# Patient Record
Sex: FEMALE | HISPANIC OR LATINO | Employment: FULL TIME | URBAN - METROPOLITAN AREA
[De-identification: names, ages, dates, MRNs, and addresses within clinical notes are randomized per-mention and may not be internally consistent; named-entity substitution may affect disease eponyms.]

---

## 2017-01-18 ENCOUNTER — HOSPITAL ENCOUNTER (OUTPATIENT)
Dept: LAB | Facility: MEDICAL CENTER | Age: 47
End: 2017-01-18
Attending: OBSTETRICS & GYNECOLOGY
Payer: COMMERCIAL

## 2017-01-18 PROCEDURE — 88175 CYTOPATH C/V AUTO FLUID REDO: CPT

## 2017-01-18 PROCEDURE — 87491 CHLMYD TRACH DNA AMP PROBE: CPT

## 2017-01-18 PROCEDURE — 87624 HPV HI-RISK TYP POOLED RSLT: CPT

## 2017-01-18 PROCEDURE — 87591 N.GONORRHOEAE DNA AMP PROB: CPT

## 2017-01-18 PROCEDURE — 87070 CULTURE OTHR SPECIMN AEROBIC: CPT

## 2017-01-19 LAB
AMBIGUOUS DTTM AMBI4: NORMAL
C TRACH DNA GENITAL QL NAA+PROBE: NEGATIVE
CYTOLOGY REG CYTOL: NORMAL
HPV HR 12 DNA CVX QL NAA+PROBE: NEGATIVE
HPV16 DNA SPEC QL NAA+PROBE: NEGATIVE
HPV18 DNA SPEC QL NAA+PROBE: NEGATIVE
N GONORRHOEA DNA GENITAL QL NAA+PROBE: NEGATIVE
SIGNIFICANT IND 70042: NORMAL
SITE SITE: NORMAL
SOURCE SOURCE: NORMAL
SPECIMEN SOURCE: NORMAL
SPECIMEN SOURCE: NORMAL

## 2017-01-21 LAB
BACTERIA GENITAL AEROBE CULT: NORMAL
SIGNIFICANT IND 70042: NORMAL
SITE SITE: NORMAL
SOURCE SOURCE: NORMAL

## 2017-01-27 ENCOUNTER — HOSPITAL ENCOUNTER (OUTPATIENT)
Dept: LAB | Facility: MEDICAL CENTER | Age: 47
End: 2017-01-27
Attending: OBSTETRICS & GYNECOLOGY
Payer: COMMERCIAL

## 2017-01-27 LAB
25(OH)D3 SERPL-MCNC: 19 NG/ML (ref 30–100)
ALBUMIN SERPL BCP-MCNC: 4.3 G/DL (ref 3.2–4.9)
ALBUMIN/GLOB SERPL: 1.4 G/DL
ALP SERPL-CCNC: 59 U/L (ref 30–99)
ALT SERPL-CCNC: 26 U/L (ref 2–50)
ANION GAP SERPL CALC-SCNC: 6 MMOL/L (ref 0–11.9)
APPEARANCE UR: CLEAR
AST SERPL-CCNC: 20 U/L (ref 12–45)
B-HCG SERPL-ACNC: <0.6 MIU/ML (ref 0–5)
BASOPHILS # BLD AUTO: 0.04 K/UL (ref 0–0.12)
BASOPHILS NFR BLD AUTO: 0.6 % (ref 0–1.8)
BILIRUB SERPL-MCNC: 0.5 MG/DL (ref 0.1–1.5)
BILIRUB UR QL STRIP.AUTO: NEGATIVE
BUN SERPL-MCNC: 12 MG/DL (ref 8–22)
CALCIUM SERPL-MCNC: 9.3 MG/DL (ref 8.5–10.5)
CHLORIDE SERPL-SCNC: 105 MMOL/L (ref 96–112)
CHOLEST SERPL-MCNC: 165 MG/DL (ref 100–199)
CO2 SERPL-SCNC: 26 MMOL/L (ref 20–33)
COLOR UR AUTO: NORMAL
CREAT SERPL-MCNC: 0.67 MG/DL (ref 0.5–1.4)
EOSINOPHIL # BLD: 0.24 K/UL (ref 0–0.51)
EOSINOPHIL NFR BLD AUTO: 3.5 % (ref 0–6.9)
ERYTHROCYTE [DISTWIDTH] IN BLOOD BY AUTOMATED COUNT: 39.2 FL (ref 35.9–50)
EST. AVERAGE GLUCOSE BLD GHB EST-MCNC: 103 MG/DL
ESTRADIOL SERPL-MCNC: 432 PG/ML
FSH SERPL-ACNC: 5.9 MIU/ML
GLOBULIN SER CALC-MCNC: 3 G/DL (ref 1.9–3.5)
GLUCOSE SERPL-MCNC: 99 MG/DL (ref 65–99)
GLUCOSE UR STRIP.AUTO-MCNC: NEGATIVE MG/DL
HBA1C MFR BLD: 5.2 % (ref 0–5.6)
HCT VFR BLD AUTO: 42.2 % (ref 37–47)
HDLC SERPL-MCNC: 46 MG/DL
HGB BLD-MCNC: 14.1 G/DL (ref 12–16)
IMM GRANULOCYTES # BLD AUTO: 0.06 K/UL (ref 0–0.11)
IMM GRANULOCYTES NFR BLD AUTO: 0.9 % (ref 0–0.9)
KETONES UR STRIP.AUTO-MCNC: NEGATIVE MG/DL
LDLC SERPL CALC-MCNC: 84 MG/DL
LEUKOCYTE ESTERASE UR QL STRIP.AUTO: NEGATIVE
LYMPHOCYTES # BLD: 1.44 K/UL (ref 1–4.8)
LYMPHOCYTES NFR BLD AUTO: 21.1 % (ref 22–41)
MCH RBC QN AUTO: 29.6 PG (ref 27–33)
MCHC RBC AUTO-ENTMCNC: 33.4 G/DL (ref 33.6–35)
MCV RBC AUTO: 88.7 FL (ref 81.4–97.8)
MICRO URNS: NORMAL
MONOCYTES # BLD: 0.47 K/UL (ref 0–0.85)
MONOCYTES NFR BLD AUTO: 6.9 % (ref 0–13.4)
NEUTROPHILS # BLD: 4.58 K/UL (ref 2–7.15)
NEUTROPHILS NFR BLD AUTO: 67 % (ref 44–72)
NITRITE UR QL STRIP.AUTO: NEGATIVE
NRBC # BLD AUTO: 0 K/UL
NRBC BLD-RTO: 0 /100 WBC
PH UR: 6 [PH]
PLATELET # BLD AUTO: 240 K/UL (ref 164–446)
PMV BLD AUTO: 12.5 FL (ref 9–12.9)
POTASSIUM SERPL-SCNC: 4.2 MMOL/L (ref 3.6–5.5)
PROLACTIN SERPL-MCNC: 6.04 NG/ML (ref 2.8–26)
PROT SERPL-MCNC: 7.3 G/DL (ref 6–8.2)
PROT UR QL STRIP: NEGATIVE MG/DL
RBC # BLD AUTO: 4.76 M/UL (ref 4.2–5.4)
RBC UR QL AUTO: NEGATIVE
SODIUM SERPL-SCNC: 137 MMOL/L (ref 135–145)
SP GR UR STRIP.AUTO: 1.01
TRIGL SERPL-MCNC: 173 MG/DL (ref 0–149)
TSH SERPL DL<=0.005 MIU/L-ACNC: 1 UIU/ML (ref 0.3–3.7)
WBC # BLD AUTO: 6.8 K/UL (ref 4.8–10.8)

## 2017-01-27 PROCEDURE — 84443 ASSAY THYROID STIM HORMONE: CPT

## 2017-01-27 PROCEDURE — 36415 COLL VENOUS BLD VENIPUNCTURE: CPT

## 2017-01-27 PROCEDURE — 83036 HEMOGLOBIN GLYCOSYLATED A1C: CPT

## 2017-01-27 PROCEDURE — 84146 ASSAY OF PROLACTIN: CPT

## 2017-01-27 PROCEDURE — 82670 ASSAY OF TOTAL ESTRADIOL: CPT

## 2017-01-27 PROCEDURE — 81003 URINALYSIS AUTO W/O SCOPE: CPT

## 2017-01-27 PROCEDURE — 80061 LIPID PANEL: CPT

## 2017-01-27 PROCEDURE — 84702 CHORIONIC GONADOTROPIN TEST: CPT

## 2017-01-27 PROCEDURE — 80053 COMPREHEN METABOLIC PANEL: CPT

## 2017-01-27 PROCEDURE — 83001 ASSAY OF GONADOTROPIN (FSH): CPT

## 2017-01-27 PROCEDURE — 82306 VITAMIN D 25 HYDROXY: CPT

## 2017-01-27 PROCEDURE — 85025 COMPLETE CBC W/AUTO DIFF WBC: CPT

## 2017-03-28 ENCOUNTER — HOSPITAL ENCOUNTER (OUTPATIENT)
Dept: RADIOLOGY | Facility: MEDICAL CENTER | Age: 47
End: 2017-03-28
Attending: OBSTETRICS & GYNECOLOGY
Payer: COMMERCIAL

## 2017-03-28 DIAGNOSIS — Z12.31 SCREENING MAMMOGRAM, ENCOUNTER FOR: ICD-10-CM

## 2017-03-28 PROCEDURE — 77063 BREAST TOMOSYNTHESIS BI: CPT

## 2018-04-11 ENCOUNTER — HOSPITAL ENCOUNTER (OUTPATIENT)
Dept: RADIOLOGY | Facility: MEDICAL CENTER | Age: 48
End: 2018-04-11
Attending: OBSTETRICS & GYNECOLOGY
Payer: COMMERCIAL

## 2018-04-11 DIAGNOSIS — Z12.31 VISIT FOR SCREENING MAMMOGRAM: ICD-10-CM

## 2018-04-11 PROCEDURE — 77063 BREAST TOMOSYNTHESIS BI: CPT

## 2018-05-10 ENCOUNTER — HOSPITAL ENCOUNTER (OUTPATIENT)
Facility: MEDICAL CENTER | Age: 48
End: 2018-05-10
Attending: PHYSICIAN ASSISTANT
Payer: COMMERCIAL

## 2018-05-10 ENCOUNTER — OFFICE VISIT (OUTPATIENT)
Dept: URGENT CARE | Facility: CLINIC | Age: 48
End: 2018-05-10
Payer: COMMERCIAL

## 2018-05-10 VITALS
WEIGHT: 130 LBS | HEART RATE: 94 BPM | TEMPERATURE: 102.9 F | OXYGEN SATURATION: 98 % | RESPIRATION RATE: 16 BRPM | HEIGHT: 60 IN | SYSTOLIC BLOOD PRESSURE: 112 MMHG | DIASTOLIC BLOOD PRESSURE: 70 MMHG | BODY MASS INDEX: 25.52 KG/M2

## 2018-05-10 DIAGNOSIS — N12 PYELONEPHRITIS: Primary | ICD-10-CM

## 2018-05-10 DIAGNOSIS — N12 PYELONEPHRITIS: ICD-10-CM

## 2018-05-10 LAB
APPEARANCE UR: NORMAL
BILIRUB UR STRIP-MCNC: NORMAL MG/DL
COLOR UR AUTO: YELLOW
GLUCOSE UR STRIP.AUTO-MCNC: NORMAL MG/DL
KETONES UR STRIP.AUTO-MCNC: NORMAL MG/DL
LEUKOCYTE ESTERASE UR QL STRIP.AUTO: NORMAL
NITRITE UR QL STRIP.AUTO: NORMAL
PH UR STRIP.AUTO: 6 [PH] (ref 5–8)
PROT UR QL STRIP: NORMAL MG/DL
RBC UR QL AUTO: NORMAL
SP GR UR STRIP.AUTO: 1.01
UROBILINOGEN UR STRIP-MCNC: NORMAL MG/DL

## 2018-05-10 PROCEDURE — 87077 CULTURE AEROBIC IDENTIFY: CPT

## 2018-05-10 PROCEDURE — 87186 SC STD MICRODIL/AGAR DIL: CPT

## 2018-05-10 PROCEDURE — 81002 URINALYSIS NONAUTO W/O SCOPE: CPT | Performed by: PHYSICIAN ASSISTANT

## 2018-05-10 PROCEDURE — 99204 OFFICE O/P NEW MOD 45 MIN: CPT | Performed by: PHYSICIAN ASSISTANT

## 2018-05-10 PROCEDURE — 87086 URINE CULTURE/COLONY COUNT: CPT

## 2018-05-10 RX ORDER — CIPROFLOXACIN 500 MG/1
500 TABLET, FILM COATED ORAL EVERY 12 HOURS
Qty: 14 TAB | Refills: 0 | Status: SHIPPED | OUTPATIENT
Start: 2018-05-10 | End: 2018-05-17

## 2018-05-10 RX ORDER — PHENAZOPYRIDINE HYDROCHLORIDE 200 MG/1
200 TABLET, FILM COATED ORAL 3 TIMES DAILY
Qty: 6 TAB | Refills: 0 | Status: SHIPPED | OUTPATIENT
Start: 2018-05-10 | End: 2018-05-12

## 2018-05-13 LAB
BACTERIA UR CULT: ABNORMAL
BACTERIA UR CULT: ABNORMAL
SIGNIFICANT IND 70042: ABNORMAL
SITE SITE: ABNORMAL
SOURCE SOURCE: ABNORMAL

## 2018-05-13 NOTE — PROGRESS NOTES
Subjective:      Pt is a 47 y.o. female who presents with Back Pain ((R) side x 5 days and radiating to her left and up to her shoulder)            HPI  PT comes into the UC with a chief complaint of dysuria, burning on urination, urgency, frequency, and bladder pressure and has noticed blood in her urine x 5 days. PT denies fevers or chills, CP, SOB, NVD, paresthesias, headaches, dizziness, change in vision, hives, or joint pain. PT states the pain is a 6/10 with burning upon urination, aching in nature and worse at night. She states she has not taken any RX meds for this issue. She admits to B/L flank or back pain as well. The pt's medication list, problem list, and allergies have been evaluated and reviewed during today's visit.      PMH:  Negative per pt.      PSH:  Negative per pt.      Fam Hx:  the patient's family history is not pertinent to their current complaint      Soc HX:  Social History     Social History   • Marital status:      Spouse name: N/A   • Number of children: N/A   • Years of education: N/A     Occupational History   • Not on file.     Social History Main Topics   • Smoking status: Never Smoker   • Smokeless tobacco: Never Used   • Alcohol use No   • Drug use: No   • Sexual activity: Not on file     Other Topics Concern   • Not on file     Social History Narrative    ** Merged History Encounter **              Medications:    Current Outpatient Prescriptions:   •  ciprofloxacin (CIPRO) 500 MG Tab, Take 1 Tab by mouth every 12 hours for 7 days., Disp: 14 Tab, Rfl: 0  •  phenazopyridine (PYRIDIUM) 200 MG Tab, Take 1 Tab by mouth 3 times a day for 2 days., Disp: 6 Tab, Rfl: 0  •  acetaminophen (TYLENOL) 500 MG TABS, Given in UC, Disp: 2 Tab, Rfl: 0  •  ondansetron (ZOFRAN) 4 MG TABS, Take 1 Tab by mouth 4 times a day as needed for Nausea/Vomiting., Disp: 8 Tab, Rfl: 0      Allergies:  Seasonal    ROS  Review of Systems   Constitutional: Negative for fever, chills and malaise/fatigue.    HENT: Negative for congestion and sore throat.    Eyes: Negative for blurred vision, double vision and photophobia.   Respiratory: Negative for cough and shortness of breath.    Cardiovascular: Negative for chest pain and palpitations.   Gastrointestinal: Negative for nausea, vomiting, abdominal pain, diarrhea and constipation.   Genitourinary: Positive for dysuria, urgency and frequency. POS for hematuria and flank pain.   Musculoskeletal: Negative for joint pain and myalgias.   Skin: Negative for rash.   Neurological: Negative for dizziness, tingling and headaches.   Endo/Heme/Allergies: Does not bruise/bleed easily.   Psychiatric/Behavioral: Negative for depression. The patient is not nervous/anxious.    All other systems reviewed and are negative.       Objective:     /70   Pulse 94   Temp (!) 39.4 °C (102.9 °F)   Resp 16   Ht 1.524 m (5')   Wt 59 kg (130 lb)   SpO2 98%   BMI 25.39 kg/m²      Physical Exam  Physical Exam   Constitutional: She is oriented to person, place, and time. She appears well-developed and well-nourished. No distress.   HENT:   Head: Normocephalic and atraumatic.   Right Ear: External ear normal.   Left Ear: External ear normal.   Nose: Nose normal.   Mouth/Throat: Oropharynx is clear and moist. No oropharyngeal exudate.   Eyes: Conjunctivae normal and EOM are normal. Pupils are equal, round, and reactive to light.   Neck: Normal range of motion. Neck supple. No thyromegaly present.   Cardiovascular: Normal rate, regular rhythm, normal heart sounds and intact distal pulses.  Exam reveals no gallop and no friction rub.    No murmur heard.  Pulmonary/Chest: Effort normal and breath sounds normal. No respiratory distress. She has no wheezes. She has no rales. She exhibits no tenderness.   Abdominal: Soft. Bowel sounds are normal. She exhibits no distension and no mass. There is no tenderness. There is no rebound and no guarding.   Genitourinary:        Pt deferred +B/L flank  pain  Musculoskeletal: Normal range of motion. She exhibits no edema and no tenderness.   Lymphadenopathy:     She has no cervical adenopathy.   Neurological: She is alert and oriented to person, place, and time. She has normal reflexes. No cranial nerve deficit.   Skin: Skin is warm and dry. No rash noted. No erythema.   Psychiatric: She has a normal mood and affect. Her behavior is normal. Judgment and thought content normal.             Assessment/Plan:     1. Pyelonephritis    - POCT Urinalysis-->LEUKS AND BLOOD  - Urine Culture; Future  - ciprofloxacin (CIPRO) 500 MG Tab; Take 1 Tab by mouth every 12 hours for 7 days.  Dispense: 14 Tab; Refill: 0  - phenazopyridine (PYRIDIUM) 200 MG Tab; Take 1 Tab by mouth 3 times a day for 2 days.  Dispense: 6 Tab; Refill: 0    Rest, fluids encouraged.  AVS with medical info given.  Pt was in full understanding and agreement with the plan.  Follow-up as needed if symptoms worsen or fail to improve.

## 2018-05-14 ENCOUNTER — TELEPHONE (OUTPATIENT)
Dept: URGENT CARE | Facility: CLINIC | Age: 48
End: 2018-05-14

## 2018-05-14 DIAGNOSIS — N30.01 ACUTE CYSTITIS WITH HEMATURIA: Primary | ICD-10-CM

## 2018-05-14 RX ORDER — NITROFURANTOIN 25; 75 MG/1; MG/1
100 CAPSULE ORAL 2 TIMES DAILY
Qty: 14 CAP | Refills: 0 | Status: SHIPPED | OUTPATIENT
Start: 2018-05-14 | End: 2018-05-21

## 2018-05-14 NOTE — TELEPHONE ENCOUNTER
Called and left message with pt about urine culture results which came back positive for E.coli.   I told her she has to switch to another abx as the cipro was resistant. I called in Macrobid fr the pt.   Encouraged Pt to call back with questions.  Mitchell Shaikh PA-C

## 2018-07-25 ENCOUNTER — NON-PROVIDER VISIT (OUTPATIENT)
Dept: OCCUPATIONAL MEDICINE | Facility: CLINIC | Age: 48
End: 2018-07-25

## 2018-07-25 DIAGNOSIS — Z23 NEED FOR VACCINATION: ICD-10-CM

## 2018-07-25 PROCEDURE — 90746 HEPB VACCINE 3 DOSE ADULT IM: CPT | Performed by: PREVENTIVE MEDICINE

## 2018-08-27 ENCOUNTER — OFFICE VISIT (OUTPATIENT)
Dept: URGENT CARE | Facility: CLINIC | Age: 48
End: 2018-08-27
Payer: COMMERCIAL

## 2018-08-27 ENCOUNTER — HOSPITAL ENCOUNTER (OUTPATIENT)
Facility: MEDICAL CENTER | Age: 48
End: 2018-08-27
Attending: FAMILY MEDICINE
Payer: COMMERCIAL

## 2018-08-27 VITALS
OXYGEN SATURATION: 98 % | SYSTOLIC BLOOD PRESSURE: 116 MMHG | TEMPERATURE: 100.1 F | DIASTOLIC BLOOD PRESSURE: 70 MMHG | WEIGHT: 133 LBS | BODY MASS INDEX: 26.11 KG/M2 | HEART RATE: 98 BPM | RESPIRATION RATE: 16 BRPM | HEIGHT: 60 IN

## 2018-08-27 DIAGNOSIS — R30.0 DYSURIA: ICD-10-CM

## 2018-08-27 LAB
APPEARANCE UR: NORMAL
BILIRUB UR STRIP-MCNC: NORMAL MG/DL
COLOR UR AUTO: NORMAL
GLUCOSE UR STRIP.AUTO-MCNC: NORMAL MG/DL
KETONES UR STRIP.AUTO-MCNC: NORMAL MG/DL
LEUKOCYTE ESTERASE UR QL STRIP.AUTO: NORMAL
NITRITE UR QL STRIP.AUTO: POSITIVE
PH UR STRIP.AUTO: 7.5 [PH] (ref 5–8)
PROT UR QL STRIP: 100 MG/DL
RBC UR QL AUTO: NORMAL
SP GR UR STRIP.AUTO: 1.02
UROBILINOGEN UR STRIP-MCNC: NORMAL MG/DL

## 2018-08-27 PROCEDURE — 99214 OFFICE O/P EST MOD 30 MIN: CPT | Mod: 25 | Performed by: FAMILY MEDICINE

## 2018-08-27 PROCEDURE — 81002 URINALYSIS NONAUTO W/O SCOPE: CPT | Performed by: FAMILY MEDICINE

## 2018-08-27 RX ORDER — CIPROFLOXACIN 500 MG/1
500 TABLET, FILM COATED ORAL EVERY 12 HOURS
Qty: 14 TAB | Refills: 0 | Status: SHIPPED | OUTPATIENT
Start: 2018-08-27 | End: 2018-09-03

## 2018-08-27 RX ORDER — ONDANSETRON 4 MG/1
4 TABLET, ORALLY DISINTEGRATING ORAL EVERY 8 HOURS PRN
Qty: 12 TAB | Refills: 0 | Status: SHIPPED | OUTPATIENT
Start: 2018-08-27 | End: 2019-07-02

## 2018-08-27 ASSESSMENT — ENCOUNTER SYMPTOMS
DIZZINESS: 0
FEVER: 1
FOCAL WEAKNESS: 0
VOMITING: 0
NAUSEA: 1

## 2018-08-27 NOTE — PROGRESS NOTES
Subjective:      Yareli Chapman is a 48 y.o. female who presents with Fever (Rt side pain x yesturday )    Chief Complaint   Patient presents with   • Fever     Rt side pain x yesturday         - This is a very pleasant 48 y.o. female with complaints of Rt flank pain and fever w/ headaches x 1 day along w/ some nausea. Has some dysuria           ALLERGIES:  Seasonal     PMH:  History reviewed. No pertinent past medical history.     MEDS:    Current Outpatient Prescriptions:   •  ciprofloxacin (CIPRO) 500 MG Tab, Take 1 Tab by mouth every 12 hours for 7 days., Disp: 14 Tab, Rfl: 0  •  ondansetron (ZOFRAN ODT) 4 MG TABLET DISPERSIBLE, Take 1 Tab by mouth every 8 hours as needed., Disp: 12 Tab, Rfl: 0    Current Facility-Administered Medications:   •  cefTRIAXone (ROCEPHIN) 1 g, lidocaine (XYLOCAINE) 1 % 3.6 mL for IM use, 1 g, Intramuscular, Once, Huseyin Martin M.D.    ** I have documented what I find to be significant in regards to past medical, social, family and surgical history  in my HPI or under PMH/PSH/FH review section, otherwise it is contributory **           HPI    Review of Systems   Constitutional: Positive for fever.   Gastrointestinal: Positive for nausea. Negative for vomiting.   Genitourinary: Positive for dysuria and frequency.   Neurological: Negative for dizziness and focal weakness.   All other systems reviewed and are negative.         Objective:     /70   Pulse 98   Temp 37.8 °C (100.1 °F)   Resp 16   Ht 1.524 m (5')   Wt 60.3 kg (133 lb)   SpO2 98%   BMI 25.97 kg/m²      Physical Exam   Constitutional: She appears well-developed. No distress.   HENT:   Head: Normocephalic and atraumatic.   Cardiovascular: Regular rhythm.    No murmur heard.  Pulmonary/Chest: Effort normal. No respiratory distress.   Abdominal: Soft. Bowel sounds are normal. There is no tenderness. There is no rebound and no guarding.   Neurological: She is alert. She exhibits normal muscle tone.   Skin: Skin  is warm and dry.   Psychiatric: She has a normal mood and affect. Judgment normal.   Nursing note and vitals reviewed.              Assessment/Plan:         1. Dysuria  POCT Urinalysis    Urine Culture    ciprofloxacin (CIPRO) 500 MG Tab    cefTRIAXone (ROCEPHIN) 1 g, lidocaine (XYLOCAINE) 1 % 3.6 mL for IM use    ondansetron (ZOFRAN ODT) 4 MG TABLET DISPERSIBLE    CANCELED: POCT Pregnancy       - rest hydrate  - 24hr recheck       Dx & d/c instructions discussed w/ patient and/or family members. Follow up w/ Prvt Dr or here in 1 day,  ER if worse      Possible side effects (i.e. Rash, GI upset/constipation, sedation, elevation of BP or sugars) of any medications given discussed.

## 2018-08-28 DIAGNOSIS — R30.0 DYSURIA: ICD-10-CM

## 2018-11-28 ENCOUNTER — NON-PROVIDER VISIT (OUTPATIENT)
Dept: OCCUPATIONAL MEDICINE | Facility: CLINIC | Age: 48
End: 2018-11-28

## 2018-11-28 DIAGNOSIS — Z23 NEED FOR HEPATITIS VACCINATION: ICD-10-CM

## 2018-11-28 PROCEDURE — 90746 HEPB VACCINE 3 DOSE ADULT IM: CPT | Performed by: PREVENTIVE MEDICINE

## 2019-04-11 ENCOUNTER — NON-PROVIDER VISIT (OUTPATIENT)
Dept: OCCUPATIONAL MEDICINE | Facility: CLINIC | Age: 49
End: 2019-04-11

## 2019-04-11 DIAGNOSIS — Z23 NEED FOR HEPATITIS B VACCINATION: ICD-10-CM

## 2019-04-11 PROCEDURE — 90746 HEPB VACCINE 3 DOSE ADULT IM: CPT | Performed by: PREVENTIVE MEDICINE

## 2019-04-25 ENCOUNTER — HOSPITAL ENCOUNTER (OUTPATIENT)
Dept: LAB | Facility: MEDICAL CENTER | Age: 49
End: 2019-04-25
Attending: NURSE PRACTITIONER
Payer: COMMERCIAL

## 2019-04-25 LAB
ALBUMIN SERPL BCP-MCNC: 4.3 G/DL (ref 3.2–4.9)
ALBUMIN/GLOB SERPL: 1.4 G/DL
ALP SERPL-CCNC: 81 U/L (ref 30–99)
ALT SERPL-CCNC: 21 U/L (ref 2–50)
ANION GAP SERPL CALC-SCNC: 7 MMOL/L (ref 0–11.9)
AST SERPL-CCNC: 18 U/L (ref 12–45)
BILIRUB SERPL-MCNC: 0.6 MG/DL (ref 0.1–1.5)
BUN SERPL-MCNC: 12 MG/DL (ref 8–22)
CALCIUM SERPL-MCNC: 9.3 MG/DL (ref 8.5–10.5)
CHLORIDE SERPL-SCNC: 106 MMOL/L (ref 96–112)
CHOLEST SERPL-MCNC: 151 MG/DL (ref 100–199)
CO2 SERPL-SCNC: 26 MMOL/L (ref 20–33)
CREAT SERPL-MCNC: 0.61 MG/DL (ref 0.5–1.4)
ERYTHROCYTE [DISTWIDTH] IN BLOOD BY AUTOMATED COUNT: 45 FL (ref 35.9–50)
FASTING STATUS PATIENT QL REPORTED: NORMAL
GLOBULIN SER CALC-MCNC: 3 G/DL (ref 1.9–3.5)
GLUCOSE SERPL-MCNC: 93 MG/DL (ref 65–99)
HCT VFR BLD AUTO: 45.7 % (ref 37–47)
HDLC SERPL-MCNC: 49 MG/DL
HGB BLD-MCNC: 14.7 G/DL (ref 12–16)
LDLC SERPL CALC-MCNC: 69 MG/DL
MCH RBC QN AUTO: 30.1 PG (ref 27–33)
MCHC RBC AUTO-ENTMCNC: 32.2 G/DL (ref 33.6–35)
MCV RBC AUTO: 93.6 FL (ref 81.4–97.8)
PLATELET # BLD AUTO: 228 K/UL (ref 164–446)
PMV BLD AUTO: 12.2 FL (ref 9–12.9)
POTASSIUM SERPL-SCNC: 4.3 MMOL/L (ref 3.6–5.5)
PROT SERPL-MCNC: 7.3 G/DL (ref 6–8.2)
RBC # BLD AUTO: 4.88 M/UL (ref 4.2–5.4)
SODIUM SERPL-SCNC: 139 MMOL/L (ref 135–145)
TRIGL SERPL-MCNC: 163 MG/DL (ref 0–149)
TSH SERPL DL<=0.005 MIU/L-ACNC: 2.17 UIU/ML (ref 0.38–5.33)
WBC # BLD AUTO: 7.4 K/UL (ref 4.8–10.8)

## 2019-04-25 PROCEDURE — 85027 COMPLETE CBC AUTOMATED: CPT

## 2019-04-25 PROCEDURE — 36415 COLL VENOUS BLD VENIPUNCTURE: CPT

## 2019-04-25 PROCEDURE — 80061 LIPID PANEL: CPT

## 2019-04-25 PROCEDURE — 80053 COMPREHEN METABOLIC PANEL: CPT

## 2019-04-25 PROCEDURE — 84443 ASSAY THYROID STIM HORMONE: CPT

## 2019-06-28 ENCOUNTER — HOSPITAL ENCOUNTER (OUTPATIENT)
Dept: LAB | Facility: MEDICAL CENTER | Age: 49
End: 2019-06-28
Attending: OBSTETRICS & GYNECOLOGY
Payer: COMMERCIAL

## 2019-06-28 ENCOUNTER — OCCUPATIONAL MEDICINE (OUTPATIENT)
Dept: URGENT CARE | Facility: CLINIC | Age: 49
End: 2019-06-28
Payer: COMMERCIAL

## 2019-06-28 VITALS
HEIGHT: 60 IN | RESPIRATION RATE: 16 BRPM | HEART RATE: 68 BPM | OXYGEN SATURATION: 99 % | WEIGHT: 133 LBS | TEMPERATURE: 98 F | BODY MASS INDEX: 26.11 KG/M2 | DIASTOLIC BLOOD PRESSURE: 72 MMHG | SYSTOLIC BLOOD PRESSURE: 124 MMHG

## 2019-06-28 DIAGNOSIS — Z02.83 ENCOUNTER FOR DRUG SCREENING: ICD-10-CM

## 2019-06-28 DIAGNOSIS — S09.93XA FACIAL INJURY, INITIAL ENCOUNTER: ICD-10-CM

## 2019-06-28 DIAGNOSIS — Z02.1 PRE-EMPLOYMENT DRUG SCREENING: ICD-10-CM

## 2019-06-28 LAB
AMP AMPHETAMINE: NORMAL
BREATH ALCOHOL COMMENT: NORMAL
COC COCAINE: NORMAL
INT CON NEG: NORMAL
INT CON POS: NORMAL
MET METHAMPHETAMINES: NORMAL
OPI OPIATES: NORMAL
PCP PHENCYCLIDINE: NORMAL
POC BREATHALIZER: 0 PERCENT (ref 0–0.01)
POC DRUG COMMENT 753798-OCCUPATIONAL HEALTH: NEGATIVE
THC: NORMAL

## 2019-06-28 PROCEDURE — 99214 OFFICE O/P EST MOD 30 MIN: CPT | Performed by: FAMILY MEDICINE

## 2019-06-28 PROCEDURE — 88175 CYTOPATH C/V AUTO FLUID REDO: CPT

## 2019-06-28 PROCEDURE — 87624 HPV HI-RISK TYP POOLED RSLT: CPT

## 2019-06-28 PROCEDURE — 80305 DRUG TEST PRSMV DIR OPT OBS: CPT | Performed by: FAMILY MEDICINE

## 2019-06-28 PROCEDURE — 82075 ASSAY OF BREATH ETHANOL: CPT | Performed by: FAMILY MEDICINE

## 2019-06-28 RX ORDER — OLOPATADINE HYDROCHLORIDE 7 MG/ML
SOLUTION OPHTHALMIC
Refills: 3 | COMMUNITY
Start: 2019-06-27 | End: 2019-07-09

## 2019-06-28 NOTE — LETTER
EMPLOYEE’S CLAIM FOR COMPENSATION/ REPORT OF INITIAL TREATMENT  FORM C-4    EMPLOYEE’S CLAIM - PROVIDE ALL INFORMATION REQUESTED   First Name  Ma Last Name  Air Birthdate                    1970                Sex  female Claim Number   Home Address  [address] Age  49 y.o. Height  1.524 m (5') Weight  60.3 kg (133 lb) [facility]     [facility] Zip  [address] Telephone  [phone] (home) [phone] (work)   Mailing Address  [address] [place] Zip  [address] Primary Language Spoken  English    Insurer  Unknown Third Party   Workers Choice   Employee's Occupation (Job Title) When Injury or Occupational Disease Occurred      Employer's Name  [employer]  Telephone  [phone]    Employer Address  [address]  [facility]  Zip  [address]    Date of Injury  6/28/2019               Hour of Injury  12:05 PM Date Employer Notified  6/28/2019 Last Day of Work after Injury or Occupational Disease  6/28/2019 Supervisor to Whom Injury Reported  [place]   Address or Location of Accident (if applicable)  [[facility]   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 (Be specific an answer in detail. Use additional sheet if necessary)  I opened the door and it fell and hit my face   If you believe that you have an occupational disease, when did you first have knowledge of the disability and it relationship to your employment?  n/a Witnesses to the Accident  n/a      Nature of Injury or Occupational Disease  Defer  Part(s) of Body Injured or Affected  Facial Bones, N/A, N/A    I certify that the above is true and correct to the best of my knowledge and that I have provided this information in order to obtain the benefits of [place]  I hereby authorize any physician, chiropractor, surgeon, practitioner, or other person, any hospital, including [facility] or [facility] hospital, any medical service organization, any insurance company, or other institution or organization to release to each other, any medical or other information, including benefits paid or payable, pertinent to this injury or disease, except information relative to diagnosis, treatment and/or counseling for AIDS, psychological conditions, alcohol or controlled substances, for which I must give specific authorization.  A Photostat of this authorization shall be as valid as the original.     Date   Place   Employee’s Signature   THIS REPORT MUST BE COMPLETED AND MAILED WITHIN 3 WORKING DAYS OF TREATMENT   Place  [facility]  Name of Facility  [facility]   Date  6/28/2019 Diagnosis  (S09.93XA) Facial injury, initial encounter Is there evidence the injured employee was under the influence of alcohol and/or another controlled substance at the time of accident?   Hour  2:26 PM Description of Injury or Disease  Diagnoses of Facial injury, initial encounter No   Treatment  Over-the-counter anti-inflammatories as discussed.  Have you advised the patient to remain off work five days or more?     X-Ray Findings      If Yes   From Date  To Date      From information given by the employee, together with medical evidence, can you directly connect this injury or occupational disease as job incurred?  Yes If No Full Duty  Yes Modified Duty      Is additional medical care by a physician indicated?  Yes If Modified Duty, Specify any Limitations / Restrictions      Do you know of any previous injury or disease contributing to this condition or occupational disease?                            No   Date  6/28/2019 Print Doctor’s Name Enoch Lin M.D. I certify the employer’s copy of  this form  "was mailed on:   Address  [address] [address] Insurer’s Use Only     [facility] Zip  [address]    Provider’s Tax ID Number  [id number] Telephone  Dept: [phone]        loyd-NAOMIE Mckenzie M.D.   e-Signature: Dr. Eduardo Gregg, Medical Director Degree  MD        ORIGINAL-TREATING PHYSICIAN OR CHIROPRACTOR    PAGE 2-INSURER/TPA    PAGE 3-EMPLOYER    PAGE 4-EMPLOYEE             Form C-4 (rev10/07)              BRIEF DESCRIPTION OF RIGHTS AND BENEFITS  (Pursuant to [place])    Notice of Injury or Occupational Disease (Incident Report Form C-1): If an injury or occupational disease (OD) arises out of and in the  course of employment, you must provide written notice to your employer as soon as practicable, but no later than 7 days after the accident or  OD. Your employer shall maintain a sufficient supply of the required forms.    Claim for Compensation (Form C-4): If medical treatment is sought, the form C-4 is available at the place of initial treatment. A completed  \"Claim for Compensation\" (Form C-4) must be filed within 90 days after an accident or OD. The treating physician or chiropractor must,  within 3 working days after treatment, complete and mail to the employer, the employer's insurer and third-party , the Claim for  Compensation.    Medical Treatment: If you require medical treatment for your on-the-job injury or OD, you may be required to select a physician or  chiropractor from a list provided by your workers’ compensation insurer, if it has contracted with an Organization for Managed Care (MCO) or  Preferred Provider Organization (PPO) or providers of health care. If your employer has not entered into a contract with an MCO or PPO, you  may select a physician or chiropractor from the Panel of Physicians and Chiropractors. Any medical costs related to your industrial injury or  OD will be paid by your insurer.    Temporary Total Disability (TTD): If your doctor has " certified that you are unable to work for a period of at least 5 consecutive days, or 5  cumulative days in a 20-day period, or places restrictions on you that your employer does not accommodate, you may be entitled to TTD  compensation.    Temporary Partial Disability (TPD): If the wage you receive upon reemployment is less than the compensation for TTD to which you are  entitled, the insurer may be required to pay you TPD compensation to make up the difference. TPD can only be paid for a maximum of 24  months.    Permanent Partial Disability (PPD): When your medical condition is stable and there is an indication of a PPD as a result of your injury or  OD, within 30 days, your insurer must arrange for an evaluation by a rating physician or chiropractor to determine the degree of your PPD. The  amount of your PPD award depends on the date of injury, the results of the PPD evaluation and your age and wage.    Permanent Total Disability (PTD): If you are medically certified by a treating physician or chiropractor as permanently and totally disabled  and have been granted a PTD status by your insurer, you are entitled to receive monthly benefits not to exceed 66 2/3% of your average  monthly wage. The amount of your PTD payments is subject to reduction if you previously received a PPD award.    Vocational Rehabilitation Services: You may be eligible for vocational rehabilitation services if you are unable to return to the job due to a  permanent physical impairment or permanent restrictions as a result of your injury or occupational disease.    Transportation and Per Ruthie Reimbursement: You may be eligible for travel expenses and per ruthie associated with medical treatment.    Reopening: You may be able to reopen your claim if your condition worsens after claim closure.    Appeal Process: If you disagree with a written determination issued by the insurer or the insurer does not respond to your request, you may  appeal  to the Department of Administration, , by following the instructions contained in your determination letter. You must  appeal the determination within 70 days from the date of the determination letter at [address], [address], [place], [place]  [address], or [address], [address], [place], [place] [address]. If you disagree with the  decision, you may appeal to the  Department of Administration, . You must file your appeal within 30 days from the date of the  decision  letter at [address], [address], [place], [place] [address], or [address], [address], [place], [place] [address]. If you  disagree with a decision of an , you may file a petition for judicial review with the District Court. You must do so within 30  days of the Appeal Officer’s decision. You may be represented by an  at your own expense or you may contact the [facility] for possible  representation.    [place]  for Injured Workers (NAIW): If you disagree with a  decision, you may request that NAIW represent you  without charge at an  Hearing. For information regarding denial of benefits, you may contact the [facility] at: [address], [address], [place] [address], [phone], or [address], [place] [address], [phone]    To File a Complaint with the Division: If you wish to file a complaint with the  of the Division of Industrial Relations (DIR),  please contact the Workers’ Compensation Section, [address], [address], [place], [place] [address], telephone [phone]) [phone], or  [address], [address], [place] [address], telephone [phone].    For assistance with Workers’ Compensation Issues: you may contact the Office of the Governor Consumer Health Assistance, [address], [place],  [place] [address], Toll Free 1-705.813.2270, Web site: [email or web address]., E-mail  [email or web address].                                                                                                                                                                                                                                   __________________________________________________________________                                                                   _________________                Employee Name / Signature                                                                                                                                                       Date                                                                                                                                                                                                     D-2 (rev. 10/07)

## 2019-06-28 NOTE — PROGRESS NOTES
Subjective:   Yareli Chapman is a 49 y.o. female who presents for Work-Related Injury (New W/C DOI 6/28/2019 opened a closed door and it fall on face, (R) side of face and tilted head back, nose bleed. )    DOI 6/28/2019: Patient states that she was at work today when she opened the door that fell outward onto her hitting her in the face with the edge of the door.  Patient denies bleeding but notes facial pain in a linear distribution from the right side of her forehead down to the right side of her chin with some mild dental pain.  Patient has noted some mild shoulder pain.  Patient denies numbness, tingling, weakness of bilateral upper and lower extremities.  Shoulder pain is worse with neck movements to the left.  Patient has not taken anything for the pain at this time.  Patient does endorse some mild blurriness to her right eye which she thinks is new since the injury.HPI  ROS   Objective:   /72   Pulse 68   Temp 36.7 °C (98 °F) (Temporal)   Resp 16   Ht 1.524 m (5')   Wt 60.3 kg (133 lb)   SpO2 99%   BMI 25.97 kg/m²   Physical Exam   Constitutional: She is oriented to person, place, and time. She appears well-developed and well-nourished. No distress.   Eyes: Pupils are equal, round, and reactive to light. EOM are normal.   Cardiovascular: Normal rate, regular rhythm, normal heart sounds and intact distal pulses.    Pulmonary/Chest: Effort normal and breath sounds normal. No respiratory distress.   Abdominal: Soft. Bowel sounds are normal. She exhibits no distension.   Musculoskeletal: Normal range of motion.   Neurological: She is alert and oriented to person, place, and time. She has normal reflexes.   Skin: Skin is warm and dry.   Psychiatric: She has a normal mood and affect. Her behavior is normal.     Mild tenderness to palpation left upper thoracic paraspinal musculature and into left mid trapezius along shoulder distribution.  Full range of motion cervical spine.  Range of motion left  shoulder.  Neurovascular intact bilateral upper extremities.  Mild tenderness palpation through distribution of facial injury.  No abrasion or laceration noted.  Teeth appear to be intact with no loose teeth noted.  No oral lesions or injuries noted.  No epistaxis. Visual Acuity in Right Eye - Without correction: 20/20  With correction:   Visual Acuity in Left Eye - Without correction: 20/20  With correction:   Visual Acuity in Both Eyes - Without correction: 20/25  With correction:    Assessment/Plan:   1. Facial injury, initial encounter    2. Encounter for drug screening  - POCT 6 Panel Urine Drug Screen  - POCT Breath Alcohol Test    3. Pre-employment drug screening    Other orders  - PAZEO 0.7 % Solution; INSTILL 1 DROP INTO EACH EYE ONCE DAILY; Refill: 3  Use over-the-counter pain reliever, such as acetaminophen (Tylenol), ibuprofen (Advil, Motrin) or naproxen (Aleve) as needed; follow package directions for dosing.   Differential diagnosis, natural history, supportive care, and indications for immediate follow-up discussed.

## 2019-06-28 NOTE — LETTER
[place] [id number] [facility] [address] [address]  Phone:  [phone] - Fax:  [phone]   [facility] Progress Report and Disability Certification  Date of Service: 6/28/2019   No Show:  No  Date / Time of Next Visit: 7/5/2019 (7-3-19 @ 4:20 PM)   Claim Information   Patient Name: Yareli Chapman  Claim Number:     Employer: [employer]  Date of Injury: 6/28/2019     Insurer / TPA: Workers [employer]  ID / SSN:     Occupation:   Diagnosis: Diagnoses of Facial injury, initial encounter, Encounter for drug screening, and Pre-employment drug screening were pertinent to this visit.    Medical Information   Related to Industrial Injury? Yes    Subjective Complaints:  DOI 6/28/2019: Patient states that she was at work today when she opened the door that fell outward onto her hitting her in the face with the edge of the door.  Patient denies bleeding but notes facial pain in a linear distribution from the right side of her forehead down to the right side of her chin with some mild dental pain.  Patient has noted some mild shoulder pain.  Patient denies numbness, tingling, weakness of bilateral upper and lower extremities.  Shoulder pain is worse with neck movements to the left.  Patient has not taken anything for the pain at this time.  Patient does endorse some mild blurriness to her right eye which she thinks is new since the injury.   Objective Findings: Mild tenderness to palpation left upper thoracic paraspinal musculature and into left mid trapezius along shoulder distribution.  Full range of motion cervical spine.  Range of motion left shoulder.  Neurovascular intact bilateral upper extremities.  Mild tenderness palpation through distribution of facial injury.  No abrasion or laceration noted.  Teeth appear to be intact with no loose teeth noted.  No oral lesions or injuries noted.  No epistaxis. Visual Acuity in Right Eye - Without correction: 20/20   With correction:   Visual Acuity in Left Eye - Without correction: 20/20  With correction:   Visual Acuity in Both Eyes - Without correction: 20/25  With correction:     Pre-Existing Condition(s):     Assessment:   Initial Visit    Status: Additional Care Required  Permanent Disability:No    Plan:      Diagnostics:      Comments:       Disability Information   Status: Released to Full Duty    From:  6/28/2019  Through: 7/5/2019 Restrictions are: Temporary   Physical Restrictions   Sitting:    Standing:    Stooping:    Bending:      Squatting:    Walking:    Climbing:    Pushing:      Pulling:    Other:    Reaching Above Shoulder (L):   Reaching Above Shoulder (R):       Reaching Below Shoulder (L):    Reaching Below Shoulder (R):      Not to exceed Weight Limits   Carrying(hrs):   Weight Limit(lb):   Lifting(hrs):   Weight  Limit(lb):     Comments:      Repetitive Actions   Hands: i.e. Fine Manipulations from Grasping:     Feet: i.e. Operating Foot Controls:     Driving / Operate Machinery:     Physician Name: Naomie Lin M.D. Physician Signature: NAOMIE Hatfield M.D. e-Signature: Dr. Eduardo Gregg, Medical Director   Clinic Name / Location: [address] [place] [address] Clinic Phone Number: Dept: [phone]   Appointment Time: 2:15 Pm Visit Start Time: 2:26 PM   Check-In Time:  2:11 Pm Visit Discharge Time:  4:13 pm   Original-Treating Physician or Chiropractor    Page 2-Insurer/TPA    Page 3-Employer    Page 4-Employee

## 2019-07-01 LAB
CYTOLOGY REG CYTOL: NORMAL
HPV HR 12 DNA CVX QL NAA+PROBE: NEGATIVE
HPV16 DNA SPEC QL NAA+PROBE: NEGATIVE
HPV18 DNA SPEC QL NAA+PROBE: NEGATIVE
SPECIMEN SOURCE: NORMAL

## 2019-07-02 ENCOUNTER — OCCUPATIONAL MEDICINE (OUTPATIENT)
Dept: URGENT CARE | Facility: CLINIC | Age: 49
End: 2019-07-02
Payer: COMMERCIAL

## 2019-07-02 VITALS
HEART RATE: 65 BPM | OXYGEN SATURATION: 97 % | TEMPERATURE: 97.7 F | SYSTOLIC BLOOD PRESSURE: 122 MMHG | HEIGHT: 60 IN | BODY MASS INDEX: 26.11 KG/M2 | RESPIRATION RATE: 16 BRPM | WEIGHT: 133 LBS | DIASTOLIC BLOOD PRESSURE: 80 MMHG

## 2019-07-02 DIAGNOSIS — S09.93XD FACIAL INJURY, SUBSEQUENT ENCOUNTER: Primary | ICD-10-CM

## 2019-07-02 PROCEDURE — 99214 OFFICE O/P EST MOD 30 MIN: CPT | Mod: 29 | Performed by: PHYSICIAN ASSISTANT

## 2019-07-02 NOTE — LETTER
[address] [address]  Phone:  [phone] - Fax:  [phone]   [facility] Progress Report and Disability Certification  Date of Service: 7/2/2019   No Show:  No  Date / Time of Next Visit: 7/9/2019@4:40 PM   Claim Information   Patient Name: Yareli Chapman  Claim Number:     Employer: [employer]  Date of Injury: 6/28/2019     Insurer / TPA: [employer]  ID / SSN:     Occupation:   Diagnosis: The encounter diagnosis was Facial injury, subsequent encounter.    Medical Information   Related to Industrial Injury? Yes    Subjective Complaints:  DOI 6/28/2019:   Pt returns for 2nd visit noting only slight improvement in pain. Patient states that she was at work today when she opened the door that fell outward onto her hitting her in the face with the edge of the door.  Patient denies bleeding but notes facial pain in a linear distribution from the right side of her forehead down to the right side of her chin with some mild dental pain.  Patient has noted some mild shoulder pain.  Patient denies numbness, tingling, weakness of bilateral upper and lower extremities.  Shoulder pain is worse with neck movements to the left.  Patient has not taken anything for the pain at this time.  Pt has not taken any Rx medications for this condition. Pt states the pain is a 4-5/10, aching in nature and worse at night. Pt denies CP, SOB, NVD, paresthesias, headaches, dizziness, change in vision, hives, or other joint pain. The pt's medication list, problem list, and allergies have been evaluated and reviewed during today's visit.     Objective Findings: Mild tenderness to palpation left upper thoracic paraspinal musculature and into left mid trapezius along shoulder distribution.  Full range of motion cervical spine.  Range of motion left shoulder.  Neurovascular intact bilateral upper extremities.  Mild tenderness palpation through  distribution of facial injury.  No abrasion or laceration noted.  Teeth appear to be intact with no loose teeth noted.  No oral lesions or injuries noted.  No epistaxis. Visual Acuity in Right Eye - Without correction: 20/20  With correction:   Visual Acuity in Left Eye - Without correction: 20/20  With correction:   Visual Acuity in Both Eyes - Without correction: 20/25  With correction:    Pre-Existing Condition(s):     Assessment:   Condition Improved    Status: Additional Care Required  Permanent Disability:No    Plan: Medication  Comments:OTC aleve    Diagnostics:      Comments:       Disability Information   Status: Released to Full Duty    From:  7/2/2019  Through: 7/9/2019 Restrictions are:     Physical Restrictions   Sitting:    Standing:    Stooping:    Bending:      Squatting:    Walking:    Climbing:    Pushing:      Pulling:    Other:    Reaching Above Shoulder (L):   Reaching Above Shoulder (R):       Reaching Below Shoulder (L):    Reaching Below Shoulder (R):      Not to exceed Weight Limits   Carrying(hrs):   Weight Limit(lb):   Lifting(hrs):   Weight  Limit(lb):     Comments: Full duty. Pt encouraged to take OTC pain meds which she notes she is not regularly doing.     Repetitive Actions   Hands: i.e. Fine Manipulations from Grasping:     Feet: i.e. Operating Foot Controls:     Driving / Operate Machinery:     Physician Name: Anderson Shaikh P.A.-C. Physician Signature: ANDERSON Brown P.A.-C. e-Signature: Dr. Eduardo Gregg, Medical Director   Clinic Name / Location: [address] [address] Clinic Phone Number: Dept: [phone]   Appointment Time: 10:00 Pm Visit Start Time: 9:35 PM   Check-In Time:  8:59 Pm Visit Discharge Time:  10:12 PM   Original-Treating Physician or Chiropractor    Page 2-Insurer/TPA    Page 3-Employer    Page 4-Employee

## 2019-07-03 NOTE — PATIENT INSTRUCTIONS
Venda elástica y RHCE  (Elastic Bandage and RICE)  ¿QUÉ FUNCIÓN CUMPLE LA VENDA ELÁSTICA?  Las vendas elásticas vienen de diferentes formas y tamaños. Generalmente, sirven para sujetar la lesión y reducen la hinchazón mientras usted se recupera, jerrica pueden cumplir diferentes funciones. El médico lo ayudará a decidir lo que es más adecuado para parson protección, recuperación o rehabilitación, después de mara lesión.  ¿CUÁLES SON ALGUNOS CONSEJOS GENERALES PARA EL USO DE MARA VENDA ELÁSTICA?  · Póngase la venda lowell lo indica el fabricante de la venda que está usando.  · No la ajuste demasiado, ya que esto puede interrumpir la circulación en la erickson del brazo o de la pierna por debajo de la venda.  ¨ Si mara parte del cuerpo más allá de la venda se torna color fred, se adormece, se enfría, se hincha o le causa más dolor, es probable que la venda esté muy ajustada. Si eso ocurre, retire la venda y vuelva a colocarla más floja.  · Consulte al médico si la venda parece estar agravando los problemas en lugar de mejorándolos.  · La venda elástica debe quitarse y volver a ponerse cada 3 o 4 horas, o lowell se lo haya indicado el médico.  ¿QUÉ SIGNIFICA LA SIGLA RHCE?  Los cuidados de rutina de muchas lesiones incluyen reposo, hielo, compresión y elevación (RHCE).   Reposo  El reposo es necesario para permitir que el cuerpo se recupere. Generalmente, puede reanudar hansa actividades habituales cuando se siente cómodo y el médico se lo weinberg autorizado.  Hielo  Aplicar hielo en mara lesión sirve para evitar la hinchazón y disminuye el dolor. No aplique el hielo directamente sobre la piel.  · Ponga el hielo en mara bolsa plástica.  · Coloque mara toalla entre la piel y la bolsa de hielo.  · Coloque el hielo krissy 20 minutos, 2 a 3 veces por día.  Hágalo krissy el tiempo que el médico se lo haya indicado.  Compresión  La compresión ayuda a evitar la hinchazón, alyx sujeción y ayuda con las molestias. Mara venda elástica sirve para la  compresión.  Elevación  La elevación ayuda a reducir la hinchazón y disminuye el dolor. Si es posible, la erickson lesionada debe estar a nivel del corazón o del centro del pecho, o por encima de diony.  ¿CUÁNDO DEA BUSCAR ATENCIÓN MÉDICA?  Debe buscar atención médica en las siguientes situaciones:  · El dolor o la hinchazón persisten.  · Los síntomas empeoran en vez de mejorar.  Estos síntomas pueden indicar que es necesaria mara evaluación más profunda o nuevas radiografías. En algunos casos, las radiografías no muestran si hay un hueso pequeño roto (fractura) hasta varios días más tarde. Concurra a las citas de control con el médico. Consulte con parson médico la fecha en que los resultados de las radiografías estarán disponibles. Asegúrese de obtener los resultados de las radiografías.  ¿CUÁNDO DEA BUSCAR ASISTENCIA MÉDICA INMEDIATA?  Solicite atención médica inmediatamente en las siguientes situaciones:  · Comienza a sentir súbitamente un dolor intenso en la erickson de la lesión o por debajo de esta.  · Aparece enrojecimiento o aumenta la hinchazón alrededor de la lesión.  · Tiene hormigueo o adormecimiento en la erickson de la lesión o por debajo de esta que no mejoran después de quitarse la venda elástica.     Esta información no tiene lowell fin reemplazar el consejo del médico. Asegúrese de hacerle al médico cualquier pregunta que tenga.     Document Released: 09/27/2006 Document Revised: 01/08/2016  Elsevier Interactive Patient Education ©2016 Elsevier Inc.

## 2019-07-03 NOTE — PROGRESS NOTES
Subjective:      Pt is a 49 y.o. female who presents with Follow-Up (Top of her Rt eyebrow bump, having headache come and go, WC the door was loose and hit her at forehead)      DOI 6/28/2019:   Pt returns for 2nd visit noting only slight improvement in pain. Patient states that she was at work today when she opened the door that fell outward onto her hitting her in the face with the edge of the door.  Patient denies bleeding but notes facial pain in a linear distribution from the right side of her forehead down to the right side of her chin with some mild dental pain.  Patient has noted some mild shoulder pain.  Patient denies numbness, tingling, weakness of bilateral upper and lower extremities.  Shoulder pain is worse with neck movements to the left.  Patient has not taken anything for the pain at this time.  Pt has not taken any Rx medications for this condition. Pt states the pain is a 4-5/10, aching in nature and worse at night. Pt denies CP, SOB, NVD, paresthesias, headaches, dizziness, change in vision, hives, or other joint pain. The pt's medication list, problem list, and allergies have been evaluated and reviewed during today's visit.       HPI  PMH:  Negative per pt.      PSH:  Negative per pt.      Fam Hx:  the patient's family history is not pertinent to their current complaint      Soc HX:  Social History     Social History   • Marital status:      Spouse name: N/A   • Number of children: N/A   • Years of education: N/A     Occupational History   • Not on file.     Social History Main Topics   • Smoking status: Never Smoker   • Smokeless tobacco: Never Used   • Alcohol use No   • Drug use: No   • Sexual activity: Not on file     Other Topics Concern   • Not on file     Social History Narrative    ** Merged History Encounter **              Medications:    Current Outpatient Prescriptions:   •  PAZEO 0.7 % Solution, INSTILL 1 DROP INTO EACH EYE ONCE DAILY, Disp: , Rfl:  3      Allergies:  Seasonal    ROS  Constitutional: Negative for fever, chills and malaise/fatigue.   HENT: Negative for congestion and sore throat.  +right sided facial pain and dental pain  Eyes: Negative for blurred vision, double vision and photophobia.   Respiratory: Negative for cough and shortness of breath.  Cardiovascular: Negative for chest pain and palpitations.   Gastrointestinal: Negative for heartburn, nausea, vomiting, abdominal pain, diarrhea and constipation.   Genitourinary: Negative for dysuria and flank pain.   Musculoskeletal: POS for left upper back and shoulder joint pain and myalgias.   Skin: Negative for itching and rash.   Neurological: Negative for dizziness, tingling and headaches.   Endo/Heme/Allergies: Does not bruise/bleed easily.   Psychiatric/Behavioral: Negative for depression. The patient is not nervous/anxious.           Objective:     /80   Pulse 65   Temp 36.5 °C (97.7 °F)   Resp 16   Ht 1.524 m (5')   Wt 60.3 kg (133 lb)   SpO2 97%   BMI 25.97 kg/m²      Physical Exam    Mild tenderness to palpation left upper thoracic paraspinal musculature and into left mid trapezius along shoulder distribution.  Full range of motion cervical spine.  Range of motion left shoulder.  Neurovascular intact bilateral upper extremities.  Mild tenderness palpation through distribution of facial injury.  No abrasion or laceration noted.  Teeth appear to be intact with no loose teeth noted.  No oral lesions or injuries noted.  No epistaxis. Visual Acuity in Right Eye - Without correction: 20/20  With correction:   Visual Acuity in Left Eye - Without correction: 20/20  With correction:   Visual Acuity in Both Eyes - Without correction: 20/25  With correction:      Constitutional: PT is oriented to person, place, and time. PT appears well-developed and well-nourished. No distress.   HENT:   Head: Normocephalic and atraumatic.   Mouth/Throat: Oropharynx is clear and moist. No oropharyngeal  exudate. +dental pain right side upper front teeth  Neck: Normal range of motion. Neck supple. No thyromegaly present.   Cardiovascular: Normal rate, regular rhythm, normal heart sounds and intact distal pulses.  Exam reveals no gallop and no friction rub.    No murmur heard.  Pulmonary/Chest: Effort normal and breath sounds normal. No respiratory distress. PT has no wheezes. PT has no rales. Pt exhibits no tenderness.   Abdominal: Soft. Bowel sounds are normal. PT exhibits no distension and no mass. There is no tenderness. There is no rebound and no guarding.   Neurological: PT is alert and oriented to person, place, and time. PT has normal reflexes. No cranial nerve deficit.   Skin: Skin is warm and dry. No rash noted. PT is not diaphoretic. No erythema.       Psychiatric: PT has a normal mood and affect. PT behavior is normal. Judgment and thought content normal.     Assessment/Plan:     1. Facial injury, subsequent encounter      Ice/heat therapy discussed  OTC ibuprofen for pain control  Rest, fluids encouraged.  AVS with medical info given.  Pt was in full understanding and agreement with the plan.  Differential diagnosis, natural history, supportive care, and indications for immediate follow-up discussed. All questions answered. Patient agrees with the plan of care.  Follow-up in 7 days for next WC appt as needed if symptoms worsen or fail to improve.

## 2019-07-09 ENCOUNTER — OCCUPATIONAL MEDICINE (OUTPATIENT)
Dept: URGENT CARE | Facility: CLINIC | Age: 49
End: 2019-07-09
Payer: COMMERCIAL

## 2019-07-09 VITALS
BODY MASS INDEX: 25.97 KG/M2 | TEMPERATURE: 99 F | SYSTOLIC BLOOD PRESSURE: 124 MMHG | WEIGHT: 133 LBS | OXYGEN SATURATION: 95 % | HEART RATE: 67 BPM | RESPIRATION RATE: 16 BRPM | DIASTOLIC BLOOD PRESSURE: 70 MMHG

## 2019-07-09 DIAGNOSIS — S09.93XD FACIAL INJURY, SUBSEQUENT ENCOUNTER: ICD-10-CM

## 2019-07-09 PROCEDURE — 99214 OFFICE O/P EST MOD 30 MIN: CPT | Mod: 29 | Performed by: PHYSICIAN ASSISTANT

## 2019-07-09 ASSESSMENT — ENCOUNTER SYMPTOMS
FEVER: 0
ABDOMINAL PAIN: 0
HEADACHES: 1
SHORTNESS OF BREATH: 0
CHILLS: 0
CONSTIPATION: 0
DIARRHEA: 0
COUGH: 0
DIZZINESS: 0
NAUSEA: 0
TINGLING: 0
VOMITING: 0

## 2019-07-09 NOTE — LETTER
[address] [place] [address]  Phone:  [phone] - Fax:  [phone]   [facility] Progress Report and Disability Certification  Date of Service: 7/9/2019   No Show:  No  Date / Time of Next Visit: 7/15/2019@4:40 PM   Claim Information   Patient Name: Yareli Chapman  Claim Number:     Employer: [employer]  Date of Injury:      Insurer / TPA: [employer]  ID / SSN:     Occupation:   Diagnosis: The encounter diagnosis was Facial injury, subsequent encounter.    Medical Information   Related to Industrial Injury? Yes    Subjective Complaints:  Copied from initial visit-DOI 6/28/2019: Patient states that she was at work today when she opened the door that fell outward onto her hitting her in the face with the edge of the door.  Patient denies bleeding but notes facial pain in a linear distribution from the right side of her forehead down to the right side of her chin with some mild dental pain.  Patient has noted some mild shoulder pain.  Patient denies numbness, tingling, weakness of bilateral upper and lower extremities.  Shoulder pain is worse with neck movements to the left.  Patient has not taken anything for the pain at this time.  Patient does endorse some mild blurriness to her right eye which she thinks is new since the injury    F/U 7/9/2019: The pain has improved slightly. She is now experiencing pain in her left shoudler. Pain is described as a 8/10. She has been taking ibuprofen 600 mg QD. She has been experiencing intermittent headache. No nausea, vomiting. No fever or chills. The pt is schedule to be back at work tomorrow.    Objective Findings: Physical Exam   Constitutional: She is oriented to person, place, and time. She appears well-developed and well-nourished. No distress.   HENT:   Head: Normocephalic and atraumatic.   Nose: Nose normal.   Eyes: Pupils are equal, round, and reactive to light. Conjunctivae are  normal.   Neck: Normal range of motion. Neck supple. No tracheal deviation present.   Cardiovascular: Normal rate and regular rhythm.    Pulmonary/Chest: Effort normal and breath sounds normal. No respiratory distress. She has no wheezes.   Musculoskeletal:   ROM strength, tone intact bilateral upper extremity.  Negative Neer's, Vilchis Santos, empty can test.  Distal N/V intact.   Neurological: She is alert and oriented to person, place, and time.   Skin: Skin is warm and dry. Capillary refill takes less than 2 seconds.   Psychiatric: She has a normal mood and affect. Her behavior is normal.   Vitals reviewed.     Pre-Existing Condition(s):     Assessment:   Condition Improved    Status: Additional Care Required  Permanent Disability:No    Plan: Medication    Diagnostics:      Comments:       Disability Information   Status: Released to Full Duty    From:  7/9/2019  Through: 7/15/19 Restrictions are: Temporary   Physical Restrictions   Sitting:    Standing:    Stooping:    Bending:      Squatting:    Walking:    Climbing:    Pushing:      Pulling:    Other:    Reaching Above Shoulder (L):   Reaching Above Shoulder (R):       Reaching Below Shoulder (L):    Reaching Below Shoulder (R):      Not to exceed Weight Limits   Carrying(hrs):   Weight Limit(lb):   Lifting(hrs):   Weight  Limit(lb):     Comments: Please allow for breaks if needed.     Repetitive Actions   Hands: i.e. Fine Manipulations from Grasping:     Feet: i.e. Operating Foot Controls:     Driving / Operate Machinery:     Physician Name: Margie Heart P.A.-C. Physician Signature: MARGIE Reese P.A.-C. e-Signature: Dr. Eduardo rGegg, Medical Director   Clinic Name / Location: [address] [address] Clinic Phone Number: Dept: [phone]   Appointment Time: 4:30 Pm Visit Start Time: 4:46 PM   Check-In Time:  4:32 Pm Visit Discharge Time:  5:12 PM   Original-Treating Physician or Chiropractor     Page 2-Insurer/TPA    Page 3-Employer    Page 4-Employee

## 2019-07-09 NOTE — PATIENT INSTRUCTIONS
Ejercicios para la espalda  (Back Exercises)  Estos ejercicios ayudan a tratar y a prevenir lesiones en la espalda. El objetivo es aumentar la fuerza en los músculos del vientre (abdomen) y de la espalda. Estos ejercicios también lo ayudarán a mejorar la flexibilidad. Comience a realizar estos ejercicios cuando el médico se lo indique.  CUIDADOS EN EL HOGAR  Los ejercicios para la espalda incluyen:  Inclinación de la pelvis.  · Recuéstese sobre la espalda con las rodillas flexionadas. Incline la pelvis hasta que la parte inferior de la espalda se apoye en el piso. Mantenga esta posición krissy 5 a 10 segundos. Repita diony ejercicio 5 a 10 veces.  Rodilla al pecho.  · Empuje con la rodilla contra el pecho y mantenga esta posición krissy 20 a 30 segundos. Repita con la otra pierna. [place] puede realizarlo con la otra pierna extendida o flexionada, del modo en que se sienta más cómodo. Luego presione ambas rodillas contra el pecho.  Abdominales.  · Doble las rodillas a 90 grados. Comience doblando la pelvis y sara un abdominal parcial y en forma lenta. Sólo eleve la parte superior a 30 ó 45 grados del suelo. Emplee al menos entre 2 y 3 segundos para cada abdominal. No sara los abdominales con las rodillas extendidas. Si hacer abdominales parciales le resulta difícil, simplemente sara el ejercicio jerrica sólo endureciendo los músculos del vientre(abdomen) y manteniendo según la indicación.  Elevar la cadera.  · Recuéstese sobre la espalda con las rodillas flexionadas a 90 grados. Presione con los pies y los hombros a medida que eleva las caderas a 5 cm del suelo. Mantenga krissy 10 segundos y repita 5 a 10 veces.  Arquear la espalda.  · Acuéstese sobre el estómago. Levántese apoyando los codos doblados. Presione lentamente con las isidoro, formando un arco con la erickson inferior de la espalda. Repita entre 3 y 5 veces.  Elevar los hombros.  · Acuéstese boca abajo con los brazos a los lados del cuerpo. Presione las caderas y  el torso contra el suelo mientras eleva lentamente la pauline y los hombros del suelo.  No exagere al hacer los ejercicios. Tenga cuidado al principio. Los ejercicios pueden causar algunas molestias leves en la espalda. Si el dolor dura más de 15 minutos, detenga los ejercicios hasta que consulte al médico. Los problemas en la espalda mejoran de manera lenta con esta terapia.      Esta información no tiene lowell fin reemplazar el consejo del médico. Asegúrese de hacerle al médico cualquier pregunta que tenga.     Document Released: 04/03/2012 Document Revised: 03/11/2013  [employer] Interactive Patient Education ©2016 [employer]        Ejercicios de amplitud de movimiento del hombro  (Shoulder Range of Motion Exercises)  Los ejercicios de amplitud de movimiento tienen lowell fin mantener la loyd de movimiento del hombro. Se recomiendan con frecuencia a las personas que tienen dolor en el hombro.  EJERCICIO DE MOVIMIENTO  Cuando pueda, sara diony ejercicio de 5 a 6 días a la semana, o lowell se lo haya indicado el médico. Vaya progresando hasta hacer 2 series de 10 balanceos.  Ejercicio del péndulo   Cómo hacer diony ejercicio recostado boca abajo   1. Recuéstese boca abajo sobre la cama con el abdomen cerca del borde de la cama.  2. Cuelgue el brazo sobre el borde de la cama.  3. Relaje el hombro, el brazo y la mano.  4. Balancee el brazo hacia adelante y hacia atrás en forma lenta y suave. No use los músculos del christy para balancear el brazo. Deben estar relajados. Si tiene dificultades para balancear el brazo, pídale a alguien que lo sara por usted, con mucho cuidado. Cuando sara diony ejercicio por primera vez, balancee el brazo en un ángulo de 15 grados krissy 15 segundos o balancee el brazo 10 veces. Con el tiempo, a medida que el dolor disminuya, aumente el ángulo del balanceo hasta llegar a 30 o 45 grados.  5. Repita los pasos 1 a 4 con el otro brazo.  Cómo hacer diony ejercicio de pie   1. Párese junto a mara  "pantoja o mara silla firme y apoye la mano encima.  1. Inclínese hacia adelante a la altura de la cintura.  2. Doble levemente las rodillas.  3. Relaje el otro brazo y deje que cuelgue flojo.  4. Relaje el omóplato del brazo que cuelga y deje que caiga.  5. Siga manteniendo el hombro relajado y use el movimiento del cuerpo para balancear el brazo en pequeños círculos. La primera vez que dudley diony ejercicio, balancee el brazo krissy 30 segundos o 10 veces. La próxima vez que lo dudley, balancee el brazo krissy un poco más de tiempo.  6. Párese con la pauline en alto y relájese.  7. Repita los pasos 1 a 7, jerrica esta vez cambie la dirección de los círculos.  2. Repita los pasos 1 a 8 con el otro brazo.  EJERCICIOS DE ESTIRAMIENTO  Dudley estos ejercicios de 3 a 4 veces al día, de 5 a 6 veces a la semana, o lowell se lo haya indicado el médico. Vaya progresando hasta mantener el estiramiento krissy 20 segundos.  Ejercicio de estiramiento 1   1. Levante el brazo en línea recta hacia adelante.  2. Doble el brazo con un ángulo de 90 grados a la altura del codo (ángulo recto) de modo adeola que el antebrazo cruce por radha del cuerpo y se shikha lowell la letra \"L\".  3. Use el otro brazo para tirar suavemente del codo hacia adelante y cruzar el cuerpo.  4. Repita los pasos 1 a 3 con el otro brazo.  Ejercicio de estiramiento 2   Para diony ejercicio, necesitará mara toalla o mara soga.  1. Doble un brazo por la espalda con la rome hacia afuera.  2. Con la otra mano, sostenga mara toalla.  3. Levante el brazo que sostiene la toalla por encima de la pauline y doble el brazo a la altura del codo. La janae debe quedar detrás del christy.  4. Con la mano luis armando, tome el extremo que cuelga de la toalla.  5. Con la mano que está más arriba, tire suavemente de la toalla hacia arriba.  6. Con la mano que está más abajo, tire de la toalla hacia abajo.  7. Repita los pasos 1 a 6 con el otro brazo.  EJERCICIOS DE FORTALECIMIENTO   Dudley cada prakash de estos " ejercicios en cuatro momentos diferentes del día (sesiones) todos los días o [place] se lo haya indicado el médico. Para comenzar, repita cada ejercicio 5 veces (repeticiones). Vaya progresando hasta hacer 3 series de 12 repeticiones o [place] se lo haya indicado el médico.  Ejercicio de fortalecimiento 1   Para esta actividad, necesitará mara pesa liviana. A medida que tenga más fuerza, podrá usar mara más pesada.  1. Con mara pesa en la mano, de pie, levante el brazo en línea recta hacia el costado del cuerpo hasta que esté a la misma altura que el hombro.  2. Doble el brazo a 90 grados de modo adeola que los dedos apunten hacia el techo.  3. Levante la mano lentamente hasta que el brazo quede estirado hacia arriba.  4. Repita los pasos 1 a 3 con el otro brazo.  Ejercicio de fortalecimiento 2   Para esta actividad, necesitará mara pesa liviana. A medida que tenga más fuerza, podrá usar mara más pesada.  1. Con mara pesa en la mano, de pie, estire el brazo y muévalo gradualmente hasta formar un arco, macario hacia el costado, luego hacia el frente y después por encima de la pauline.  2. Mueva gradualmente el otro brazo hasta formar un arco, macario hacia el costado, luego hacia el frente y después por encima de la pauline.  3. Repita los pasos 1 a 2 con el otro brazo.  Ejercicio de fortalecimiento 3   Para esta actividad, necesitará mara calderon elástica. A medida que tenga más fuerza, aumente gradualmente el tamaño de las bandas o la cantidad de bandas que usa a la vez.  1. De pie, sostenga mara calderon elástica con mara mano y levante yoni brazo en línea recta hacia arriba.  2. Con la otra mano, tire la calderon hacia abajo hasta que omid mano le quede al costado del cuerpo.  3. Repita los pasos 1 a 2 con el otro brazo.  Esta información no tiene [place] fin reemplazar el consejo del médico. Asegúrese de hacerle al médico cualquier pregunta que tenga.  Document Released: 10/08/2014 Document Revised: 05/03/2016 Document Reviewed:  12/14/2015  Elsevier Interactive Patient Education © 2017 [employer] Inc.

## 2019-07-09 NOTE — PROGRESS NOTES
Subjective:   Yareli Chapman is a 49 y.o. female who presents for Facial Injury ([facility] pt states she feels better but her back is still hurting)    Copied from initial visit-DOI 6/28/2019: Patient states that she was at work today when she opened the door that fell outward onto her hitting her in the face with the edge of the door.  Patient denies bleeding but notes facial pain in a linear distribution from the right side of her forehead down to the right side of her chin with some mild dental pain.  Patient has noted some mild shoulder pain.  Patient denies numbness, tingling, weakness of bilateral upper and lower extremities.  Shoulder pain is worse with neck movements to the left.  Patient has not taken anything for the pain at this time.  Patient does endorse some mild blurriness to her right eye which she thinks is new since the injury    F/U 7/9/2019: The pain has improved slightly. She is now experiencing pain in her left shoudler. Pain is described as a 8/10. She has been taking ibuprofen 600 mg QD. She has been experiencing intermittent headache. No nausea, vomiting. No fever or chills. The pt is schedule to be back at work tomorrow.    HPI  Review of Systems   Constitutional: Negative for chills, fever and malaise/fatigue.   Respiratory: Negative for cough and shortness of breath.    Gastrointestinal: Negative for abdominal pain, constipation, diarrhea, nausea and vomiting.   Neurological: Positive for headaches. Negative for dizziness and tingling.   All other systems reviewed and are negative.      PMH: No pertinent past medical history to this problem  MEDS: Medications were reviewed in Epic  ALLERGIES: Allergies were reviewed in Epic  SOCHX: Works as  - [employer]  FH: No pertinent family history to this problem       Objective:   /70 (BP Location: Left arm, Patient Position: Sitting, BP Cuff Size: Adult)   Pulse 67   Temp 37.2 °C (99 °F) (Temporal)   Resp 16   Wt 60.3 kg (133  lb)   SpO2 95%   BMI 25.97 kg/m²     Physical Exam   Constitutional: She is oriented to person, place, and time. She appears well-developed and well-nourished. No distress.   HENT:   Head: Normocephalic and atraumatic.   Nose: Nose normal.   Eyes: Pupils are equal, round, and reactive to light. Conjunctivae are normal.   Neck: Normal range of motion. Neck supple. No tracheal deviation present.   Cardiovascular: Normal rate and regular rhythm.    Pulmonary/Chest: Effort normal and breath sounds normal. No respiratory distress. She has no wheezes.   Musculoskeletal:   ROM strength, tone intact bilateral upper extremity.  Negative Neer's, Vilchis Santos, empty can test.  Distal N/V intact.   Neurological: She is alert and oriented to person, place, and time.   Skin: Skin is warm and dry. Capillary refill takes less than 2 seconds.   Psychiatric: She has a normal mood and affect. Her behavior is normal.   Vitals reviewed.       Assessment/Plan:     1. Facial injury, subsequent encounter       Supportive care reviewed.  Continue ibuprofen 600 mg 3 times daily as needed for pain.  Patient declines work restrictions, she is released to full duty with frequent breaks.  We will reevaluate in 5 days.  D 39 provided.    If symptoms worsen or persist patient can return to clinic for reevaluation. Patient verbalized understanding of information.    Please note that this dictation was created using voice recognition software. I have made every reasonable attempt to correct obvious errors, but I expect that there are errors of grammar and possibly content that I did not discover before finalizing the note.

## 2019-07-10 ENCOUNTER — HOSPITAL ENCOUNTER (OUTPATIENT)
Dept: RADIOLOGY | Facility: MEDICAL CENTER | Age: 49
End: 2019-07-10
Attending: OBSTETRICS & GYNECOLOGY
Payer: COMMERCIAL

## 2019-07-10 DIAGNOSIS — Z12.31 VISIT FOR SCREENING MAMMOGRAM: ICD-10-CM

## 2019-07-10 PROCEDURE — 77063 BREAST TOMOSYNTHESIS BI: CPT

## 2019-07-15 ENCOUNTER — OCCUPATIONAL MEDICINE (OUTPATIENT)
Dept: URGENT CARE | Facility: CLINIC | Age: 49
End: 2019-07-15
Payer: COMMERCIAL

## 2019-07-15 VITALS
WEIGHT: 133 LBS | BODY MASS INDEX: 25.97 KG/M2 | SYSTOLIC BLOOD PRESSURE: 114 MMHG | OXYGEN SATURATION: 96 % | HEART RATE: 70 BPM | DIASTOLIC BLOOD PRESSURE: 60 MMHG | TEMPERATURE: 98.7 F | RESPIRATION RATE: 16 BRPM

## 2019-07-15 DIAGNOSIS — M62.838 MUSCLE SPASMS OF NECK: ICD-10-CM

## 2019-07-15 DIAGNOSIS — S09.93XD FACIAL INJURY, SUBSEQUENT ENCOUNTER: ICD-10-CM

## 2019-07-15 PROCEDURE — 99214 OFFICE O/P EST MOD 30 MIN: CPT | Mod: 29 | Performed by: PHYSICIAN ASSISTANT

## 2019-07-15 RX ORDER — CYCLOBENZAPRINE HCL 5 MG
5 TABLET ORAL
Qty: 10 TAB | Refills: 0 | Status: SHIPPED | OUTPATIENT
Start: 2019-07-15

## 2019-07-15 ASSESSMENT — ENCOUNTER SYMPTOMS
NAUSEA: 0
HEADACHES: 0
VOMITING: 0
FEVER: 0
COUGH: 0

## 2019-07-15 NOTE — LETTER
[address] [place] [address]  Phone:  [phone] - Fax:  [phone]   [facility] Progress Report and Disability Certification  Date of Service: 7/15/2019   No Show:  No  Date / Time of Next Visit: 7/22/2019@5:20 PM   Claim Information   Patient Name: Yareli Chapman  Claim Number:     Employer: [employer]  Date of Injury: 6/28/2019     Insurer / TPA: Workers [employer]  ID / SSN:     Occupation:   Diagnosis: Diagnoses of Facial injury, subsequent encounter and Muscle spasms of neck were pertinent to this visit.    Medical Information   Related to Industrial Injury? Yes    Subjective Complaints:  The patient presents to clinic for Workman's Comp follow-up.     DOI: 6/28/19 - The patient was at work when she opened a door that fell outward, hitting her face with the edge of the door. The patient denied bleeding at the time of the injury. The patient notes pain to her right side of her face from her forehead to her chin. The patient also notes mild dental pain. The patient initially reported right should pain. At her last visit, the patient noted left shoulder pain.     Today, the patient states her symptoms are improving. The patient notes mild soreness to the right side of her face near her eyebrow with palpation or movement of the right eyebrow. The patient denies headaches. No nausea/vomiting. No vision changes. The patient reports continued pain to her left shoulder/left lateral neck. She reports increased pain with movement of her neck and shoulder, especially turning her head to the left and lifting her shoulder above her head. The patient also reports intermittent tingling to her left upper extremity. She denies weakness. The patient is currently taking 600mg of Advil as needed for her symptoms.    Objective Findings: Physical Exam   Constitutional: She is oriented to person, place, and time. She appears well-developed and  well-nourished. No distress.   HENT:   Head: Normocephalic and atraumatic.   Right Ear: External ear normal.   Left Ear: External ear normal.   Nose: Nose normal.   Eyes: Conjunctivae and EOM are normal.   Neck: Normal range of motion. Neck supple.   Cardiovascular: Normal rate.    Pulmonary/Chest: Effort normal.   Left Lateral Neck/Shoulder:  Tenderness to palpation of the left lateral neck consistent with a muscle spasm.   No bony tenderness to left shoulder.   No midine neck tenderness.  Decreased ROM - the patient demonstrates decreased ROM with turning her head to the left and lifting her left shoulder above her head secondary to pain.  Neurovascular intact  Strength 5/5 - equal bilateral upper extremities   Strength 5/5  Intrinsic muscles intact   Pre-Existing Condition(s):     Assessment:   Condition Improved    Status: Additional Care Required  Permanent Disability:No    Plan: Medication (NOT at Work)    Diagnostics:      Comments:  Plan:  Full Duty without Restrictions  Continue OTC NSAIDs for pain/discomfort  Flexeril 5mg PO QHS as needed for pain/spasm  -- Advised the patient to only take the medication at nigh, as it may cause drowsiness.  -- Instructed the patient not to ta  ke the medication while driving, operating machinery, or while at work  Apply ice and/or heat to the affected area for symptomatic relief  Follow-up in 1 week for reassessment  Return to clinic sooner if symptoms worsen, or if the patient should deve  lop increasing facial pain, headaches, vision changes, neck pain, increasing left shoulder pain, numbness, tingling, or weakness to her left upper extremity, decreased range of motion, fever/chills, and/or any concerning symptoms.      Disability Information   Status: Released to Full Duty    From:  7/15/2019  Through: 7/22/2019 Restrictions are:     Physical Restrictions   Sitting:    Standing:    Stooping:    Bending:      Squatting:    Walking:    Climbing:    Pushing:         Pulling:    Other:    Reaching Above Shoulder (L):   Reaching Above Shoulder (R):       Reaching Below Shoulder (L):    Reaching Below Shoulder (R):      Not to exceed Weight Limits   Carrying(hrs):   Weight Limit(lb):   Lifting(hrs):   Weight  Limit(lb):     Comments: Allow for 10 minute breaks as needed if shoulder pain worsens or increases secondary to job duties.     Repetitive Actions   Hands: i.e. Fine Manipulations from Grasping:     Feet: i.e. Operating Foot Controls:     Driving / Operate Machinery:     Physician Name: Chaim Fajardo P.A.-C. Physician Signature: CHAIM Liu P.A.-C. e-Signature: Dr. Eduardo Gregg, Medical Director   Clinic Name / Location: [address] [address] Clinic Phone Number: Dept: [phone]   Appointment Time: 4:30 Pm Visit Start Time: 4:57 PM   Check-In Time:  4:43 Pm Visit Discharge Time:  5:55 PM   Original-Treating Physician or Chiropractor    Page 2-Insurer/TPA    Page 3-Employer    Page 4-Employee

## 2019-07-16 NOTE — PROGRESS NOTES
Subjective:      Yareli Chapman is a 49 y.o. female who presents with Follow-Up ([facility] pt states she is still having back pain)          HPI    The patient presents to clinic for Workman's Comp follow-up.     DOI: 6/28/19 - The patient was at work when she opened a door that fell outward, hitting her face with the edge of the door. The patient denied bleeding at the time of the injury. The patient notes pain to her right side of her face from her forehead to her chin. The patient also notes mild dental pain. The patient initially reported right should pain. At her last visit, the patient noted left shoulder pain.     Today, the patient states her symptoms are improving. The patient notes mild soreness to the right side of her face near her eyebrow with palpation or movement of the right eyebrow. The patient denies headaches. No nausea/vomiting. No vision changes. The patient reports continued pain to her left shoulder/left lateral neck. She reports increased pain with movement of her neck and shoulder, especially turning her head to the left and lifting her shoulder above her head. The patient also reports intermittent tingling to her left upper extremity. She denies weakness. The patient is currently taking 600mg of Advil as needed for her symptoms.     PMH:  has no past medical history on file.  MEDS: No current outpatient prescriptions on file.  ALLERGIES: Reviewed in Epic.  SURGHX: No past surgical history on file.  SOCHX: Reviewed in Epic.  FH: Family history was reviewed, no pertinent findings to report    Review of Systems   Constitutional: Negative for fever.   HENT: Negative for congestion.    Respiratory: Negative for cough.    Gastrointestinal: Negative for nausea and vomiting.   Musculoskeletal: Positive for joint pain.   Neurological: Negative for headaches.          Objective:     /60 (BP Location: Left arm, Patient Position: Sitting, BP Cuff Size: Adult)   Pulse 70   Temp 37.1 °C (98.7 °F)  (Temporal)   Resp 16   Wt 60.3 kg (133 lb)   SpO2 96%   BMI 25.97 kg/m²      Physical Exam   Constitutional: She is oriented to person, place, and time. She appears well-developed and well-nourished. No distress.   HENT:   Head: Normocephalic and atraumatic.   Right Ear: External ear normal.   Left Ear: External ear normal.   Nose: Nose normal.   Eyes: Conjunctivae and EOM are normal.   Neck: Normal range of motion. Neck supple.   Cardiovascular: Normal rate.    Pulmonary/Chest: Effort normal.   Musculoskeletal:        Arms:  Left Lateral Neck/Shoulder:  Tenderness to palpation of the left lateral neck consistent with a muscle spasm.   No bony tenderness to left shoulder.   No midine neck tenderness.  Decreased ROM - the patient demonstrates decreased ROM with turning her head to the left and lifting her left shoulder above her head secondary to pain.  Neurovascular intact  Strength 5/5 - equal bilateral upper extremities   Strength 5/5  Intrinsic muscles intact   Neurological: She is alert and oriented to person, place, and time.   Skin: Skin is warm and dry.               Assessment/Plan:     1. Facial injury, subsequent encounter    2. Muscle spasms of neck  - cyclobenzaprine (FLEXERIL) 5 MG tablet; Take 1 Tab by mouth at bedtime as needed.  Dispense: 10 Tab; Refill: 0    Plan:  Full Duty without Restrictions  Continue OTC NSAIDs for pain/discomfort  Flexeril 5mg PO QHS as needed for pain/spasm  -- Advised the patient to only take the medication at night, as it may cause drowsiness.  -- Instructed the patient not to take the medication while driving, operating machinery, or while at work  Apply ice and/or heat to the affected area for symptomatic relief  Follow-up in 1 week for reassessment  Return to clinic sooner if symptoms worsen, or if the patient should develop increasing facial pain, headaches, vision changes, neck pain, increasing left shoulder pain, numbness, tingling, or weakness to her left  upper extremity, decreased range of motion, fever/chills, and/or any concerning symptoms.    Discussed plan with the patient, and she agrees to the above.

## 2019-07-22 ENCOUNTER — OCCUPATIONAL MEDICINE (OUTPATIENT)
Dept: URGENT CARE | Facility: CLINIC | Age: 49
End: 2019-07-22
Payer: COMMERCIAL

## 2019-07-22 VITALS
TEMPERATURE: 97.7 F | RESPIRATION RATE: 14 BRPM | BODY MASS INDEX: 26.11 KG/M2 | HEART RATE: 76 BPM | DIASTOLIC BLOOD PRESSURE: 82 MMHG | SYSTOLIC BLOOD PRESSURE: 110 MMHG | HEIGHT: 60 IN | OXYGEN SATURATION: 94 % | WEIGHT: 133 LBS

## 2019-07-22 DIAGNOSIS — S09.93XD FACIAL INJURY, SUBSEQUENT ENCOUNTER: ICD-10-CM

## 2019-07-22 DIAGNOSIS — M62.830 BACK MUSCLE SPASM: ICD-10-CM

## 2019-07-22 PROCEDURE — 99213 OFFICE O/P EST LOW 20 MIN: CPT | Mod: 29 | Performed by: PHYSICIAN ASSISTANT

## 2019-07-22 NOTE — LETTER
[address] [place] [address]  Phone:  [phone] - Fax:  [phone]   [facility] Progress Report and Disability Certification  Date of Service: 7/22/2019   No Show:  No  Date / Time of Next Visit:  7/29/19@5:00 PM   Claim Information   Patient Name: Yareli Chapman  Claim Number:     Employer: [employer]  Date of Injury: 6/28/2019     Insurer / TPA: Workers [employer]  ID / SSN:     Occupation:   Diagnosis: Diagnoses of Facial injury, subsequent encounter and Back muscle spasm were pertinent to this visit.    Medical Information   Related to Industrial Injury? Yes    Subjective Complaints:  DOI: 6/28/19. Facial injury after fall. 5th visit to . Today improved. Still having neck pain on the left side with spams. On full duty, tolerating well. Taking NSAIDS and flexeril at night. No new sxs.   Objective Findings: Mild supraorbital pain of the right eye.  EOMs intact.  No deformities or step-offs.  No bruising seen.  Mild left sided cervical muscular tenderness with spasm.  No midline tenderness.  Range of motion normal.   Pre-Existing Condition(s):     Assessment:   Condition Improved    Status: Additional Care Required  Permanent Disability:No    Plan: Medication (NOT at Work)Medication    Diagnostics:      Comments:       Disability Information   Status: Released to Full Duty    From:   7/21/19  Through:7/29/19   Restrictions are:     Physical Restrictions   Sitting:    Standing:    Stooping:    Bending:      Squatting:    Walking:    Climbing:    Pushing:      Pulling:    Other:    Reaching Above Shoulder (L):   Reaching Above Shoulder (R):       Reaching Below Shoulder (L):    Reaching Below Shoulder (R):      Not to exceed Weight Limits   Carrying(hrs):   Weight Limit(lb):   Lifting(hrs):   Weight  Limit(lb):     Comments:      Repetitive Actions   Hands: i.e. Fine Manipulations from Grasping:     Feet: i.e. Operating Foot  Controls:     Driving / Operate Machinery:     Physician Name: Jonathan Iniguez P.A.-C. Physician Signature:   e-Signature: Dr. Eduardo Gregg, Medical Director   Clinic Name / Location: [address] [address] Clinic Phone Number: Dept: [phone]   Appointment Time: 5:30 Pm Visit Start Time: 5:24 PM   Check-In Time:  5:19 Pm Visit Discharge Time:     Original-Treating Physician or Chiropractor    Page 2-Insurer/TPA    Page 3-Employer    Page 4-Employee

## 2019-07-23 NOTE — PROGRESS NOTES
Subjective:      Yareli Chapman is a 49 y.o. female who presents with Follow-Up (WC pt states she feels slight pain on her neck but is doing better)      DOI: 6/28/19. Facial injury after fall. 5th visit to . Today improved. Still having neck pain on the left side with spams. On full duty, tolerating well. Taking NSAIDS and flexeril at night. No new sxs.     HPI    ROS       Objective:     /82   Pulse 76   Temp 36.5 °C (97.7 °F)   Resp 14   Ht 1.524 m (5')   Wt 60.3 kg (133 lb)   SpO2 94%   BMI 25.97 kg/m²      Physical Exam    Mild supraorbital pain of the right eye.  EOMs intact.  No deformities or step-offs.  No bruising seen.  Mild left sided cervical muscular tenderness with spasm.  No midline tenderness.  Range of motion normal.       Assessment/Plan:     1. Facial injury, subsequent encounter     2. Back muscle spasm       Improving.  Still having symptoms.  Follow-up in 1 week    Please note that this dictation was created using voice recognition software. I have made every reasonable attempt to correct obvious errors, but I expect that there are errors of grammar and possibly content that I did not discover before finalizing the note.

## 2019-07-30 ENCOUNTER — OCCUPATIONAL MEDICINE (OUTPATIENT)
Dept: URGENT CARE | Facility: CLINIC | Age: 49
End: 2019-07-30
Payer: COMMERCIAL

## 2019-07-30 VITALS
SYSTOLIC BLOOD PRESSURE: 128 MMHG | WEIGHT: 133 LBS | TEMPERATURE: 98 F | HEIGHT: 60 IN | OXYGEN SATURATION: 96 % | HEART RATE: 68 BPM | BODY MASS INDEX: 26.11 KG/M2 | RESPIRATION RATE: 16 BRPM | DIASTOLIC BLOOD PRESSURE: 72 MMHG

## 2019-07-30 DIAGNOSIS — M62.838 MUSCLE SPASMS OF NECK: ICD-10-CM

## 2019-07-30 DIAGNOSIS — S09.93XD FACIAL INJURY, SUBSEQUENT ENCOUNTER: ICD-10-CM

## 2019-07-30 PROCEDURE — 99213 OFFICE O/P EST LOW 20 MIN: CPT | Mod: 29 | Performed by: NURSE PRACTITIONER

## 2019-07-30 ASSESSMENT — ENCOUNTER SYMPTOMS: NECK PAIN: 0

## 2019-07-30 NOTE — LETTER
[facility] [address] [place] [address]  Phone:  [phone] - Fax:  [phone]   [facility] Progress Report and Disability Certification  Date of Service: 7/30/2019   No Show:  No  Date / Time of Next Visit:  MMI   Claim Information   Patient Name: Yareli Chapman  Claim Number:     Employer: [employer]  Date of Injury: 6/28/2019     Insurer / TPA: [employer]  ID / SSN:     Occupation:   Diagnosis: Diagnoses of Facial injury, subsequent encounter and Muscle spasms of neck were pertinent to this visit.    Medical Information   Related to Industrial Injury?      Subjective Complaints:  DOI: 6/28/19. Facial injury after fall. 5th visit to . Today improved. Very mild neck pain on the left side. On full duty, tolerating well. Taking NSAIDS and flexeril at night. No new sxs. Reports 98% improvement since DOI.   Objective Findings: No facial pain.  EOMs intact.  No deformities or step-offs.  No bruising seen.  Mild left sided cervical muscular tenderness.  No midline tenderness.  Range of motion normal.   Pre-Existing Condition(s):     Assessment:   Condition Improved    Status: Discharged /  MMI  Permanent Disability:No    Plan:      Diagnostics:      Comments:       Disability Information   Status: Released to Full Duty    From:     Through:   Restrictions are:     Physical Restrictions   Sitting:    Standing:    Stooping:    Bending:      Squatting:    Walking:    Climbing:    Pushing:      Pulling:    Other:    Reaching Above Shoulder (L):   Reaching Above Shoulder (R):       Reaching Below Shoulder (L):    Reaching Below Shoulder (R):      Not to exceed Weight Limits   Carrying(hrs):   Weight Limit(lb):   Lifting(hrs):   Weight  Limit(lb):     Comments:      Repetitive Actions   Hands: i.e. Fine Manipulations from Grasping:     Feet: i.e. Operating Foot Controls:     Driving / Operate Machinery:     Physician Name: Barbie CHEUNG  ALEX Mcgregor Physician Signature: LORRI Simon e-Signature: Dr. Eduardo Gregg, Medical Director   Clinic Name / Location: [address] [address] Clinic Phone Number: Dept: [phone]   Appointment Time: 10:00 Pm Visit Start Time: 10:08 PM   Check-In Time:  9:50 Pm Visit Discharge Time:  10:43 PM   Original-Treating Physician or Chiropractor    Page 2-Insurer/TPA    Page 3-Employer    Page 4-Employee

## 2019-07-31 NOTE — PROGRESS NOTES
Subjective:      Yareli Chapman is a 49 y.o. female who presents with Follow-Up (neck,Lt shoulder follow up WC injury, alot better)      DOI: 6/28/19. Facial injury after fall. 5th visit to . Today improved. Very mild neck pain on the left side. On full duty, tolerating well. Taking NSAIDS and flexeril at night. No new sxs. Reports 98% improvement since DOI.     HPI    Review of Systems   Musculoskeletal: Negative for neck pain.   All other systems reviewed and are negative.    PMH: No pertinent past medical history to this problem  MEDS: Medications were reviewed in Epic  ALLERGIES: Allergies were reviewed in Epic  SOCHX: Works as a room attendent at [employer]   FH: No pertinent family history to this problem         Objective:     /72   Pulse 68   Temp 36.7 °C (98 °F)   Resp 16   Ht 1.524 m (5')   Wt 60.3 kg (133 lb)   SpO2 96%   BMI 25.97 kg/m²      Physical Exam   Constitutional: She is oriented to person, place, and time. Vital signs are normal. She appears well-developed and well-nourished.   HENT:   Head: Normocephalic and atraumatic.   Eyes: Pupils are equal, round, and reactive to light. EOM are normal.   Neck: Normal range of motion. Muscular tenderness present.       Cardiovascular: Normal rate and regular rhythm.    Pulmonary/Chest: Effort normal.   Musculoskeletal: Normal range of motion.   Neurological: She is alert and oriented to person, place, and time.   Skin: Skin is warm and dry. Capillary refill takes less than 2 seconds.   Psychiatric: She has a normal mood and affect. Her speech is normal and behavior is normal. Thought content normal.   Vitals reviewed.      No facial pain.  EOMs intact.  No deformities or step-offs.  No bruising seen.  Mild left sided cervical muscular tenderness.  No midline tenderness.  Range of motion normal.       Assessment/Plan:     1. Facial injury, subsequent encounter    2. Muscle spasms of neck    Discharged MMI

## 2020-05-20 ENCOUNTER — HOSPITAL ENCOUNTER (OUTPATIENT)
Facility: MEDICAL CENTER | Age: 50
End: 2020-05-20
Payer: COMMERCIAL

## 2020-05-22 LAB
SARS-COV-2 RNA SPEC QL NAA+PROBE: NOT DETECTED
SPECIMEN SOURCE: NORMAL

## 2020-10-15 ENCOUNTER — IMMUNIZATION (OUTPATIENT)
Dept: SOCIAL WORK | Facility: CLINIC | Age: 50
End: 2020-10-15
Payer: COMMERCIAL

## 2020-10-15 DIAGNOSIS — Z23 NEED FOR VACCINATION: Primary | ICD-10-CM

## 2020-10-15 PROCEDURE — 90686 IIV4 VACC NO PRSV 0.5 ML IM: CPT | Performed by: REGISTERED NURSE

## 2020-10-15 PROCEDURE — 90471 IMMUNIZATION ADMIN: CPT | Performed by: REGISTERED NURSE

## 2020-10-20 ENCOUNTER — HOSPITAL ENCOUNTER (OUTPATIENT)
Facility: MEDICAL CENTER | Age: 50
End: 2020-10-20
Attending: NURSE PRACTITIONER
Payer: COMMERCIAL

## 2020-10-20 PROCEDURE — 88175 CYTOPATH C/V AUTO FLUID REDO: CPT

## 2020-10-20 PROCEDURE — 87624 HPV HI-RISK TYP POOLED RSLT: CPT

## 2020-10-29 ENCOUNTER — HOSPITAL ENCOUNTER (OUTPATIENT)
Dept: RADIOLOGY | Facility: MEDICAL CENTER | Age: 50
End: 2020-10-29
Attending: NURSE PRACTITIONER
Payer: COMMERCIAL

## 2020-10-29 ENCOUNTER — HOSPITAL ENCOUNTER (OUTPATIENT)
Facility: MEDICAL CENTER | Age: 50
End: 2020-10-29
Attending: NURSE PRACTITIONER
Payer: COMMERCIAL

## 2020-10-29 ENCOUNTER — HOSPITAL ENCOUNTER (OUTPATIENT)
Dept: LAB | Facility: MEDICAL CENTER | Age: 50
End: 2020-10-29
Attending: NURSE PRACTITIONER
Payer: COMMERCIAL

## 2020-10-29 DIAGNOSIS — R07.81 RIB PAIN ON LEFT SIDE: ICD-10-CM

## 2020-10-29 LAB
CHOLEST SERPL-MCNC: 142 MG/DL (ref 100–199)
HDLC SERPL-MCNC: 40 MG/DL
LDLC SERPL CALC-MCNC: 85 MG/DL
TRIGL SERPL-MCNC: 87 MG/DL (ref 0–149)

## 2020-10-29 PROCEDURE — 36415 COLL VENOUS BLD VENIPUNCTURE: CPT

## 2020-10-29 PROCEDURE — 80061 LIPID PANEL: CPT

## 2020-10-29 PROCEDURE — 71101 X-RAY EXAM UNILAT RIBS/CHEST: CPT | Mod: LT

## 2020-10-29 PROCEDURE — 82274 ASSAY TEST FOR BLOOD FECAL: CPT

## 2020-11-11 LAB — AMBIGUOUS DTTM AMBI4: NORMAL

## 2020-11-14 LAB — HEMOCCULT STL QL IA: NEGATIVE

## 2021-01-16 ENCOUNTER — OFFICE VISIT (OUTPATIENT)
Dept: URGENT CARE | Facility: CLINIC | Age: 51
End: 2021-01-16
Payer: COMMERCIAL

## 2021-01-16 ENCOUNTER — HOSPITAL ENCOUNTER (OUTPATIENT)
Facility: MEDICAL CENTER | Age: 51
End: 2021-01-16
Attending: FAMILY MEDICINE
Payer: COMMERCIAL

## 2021-01-16 VITALS
OXYGEN SATURATION: 97 % | HEIGHT: 59 IN | WEIGHT: 119 LBS | BODY MASS INDEX: 23.99 KG/M2 | TEMPERATURE: 97.4 F | SYSTOLIC BLOOD PRESSURE: 108 MMHG | RESPIRATION RATE: 14 BRPM | HEART RATE: 66 BPM | DIASTOLIC BLOOD PRESSURE: 76 MMHG

## 2021-01-16 DIAGNOSIS — H81.10 BENIGN PAROXYSMAL POSITIONAL VERTIGO, UNSPECIFIED LATERALITY: ICD-10-CM

## 2021-01-16 LAB
ALBUMIN SERPL BCP-MCNC: 4.2 G/DL (ref 3.2–4.9)
ALBUMIN/GLOB SERPL: 1.2 G/DL
ALP SERPL-CCNC: 94 U/L (ref 30–99)
ALT SERPL-CCNC: 34 U/L (ref 2–50)
ANION GAP SERPL CALC-SCNC: 11 MMOL/L (ref 7–16)
APPEARANCE UR: CLEAR
AST SERPL-CCNC: 26 U/L (ref 12–45)
BASOPHILS # BLD AUTO: 0.6 % (ref 0–1.8)
BASOPHILS # BLD: 0.04 K/UL (ref 0–0.12)
BILIRUB SERPL-MCNC: 0.5 MG/DL (ref 0.1–1.5)
BILIRUB UR STRIP-MCNC: NORMAL MG/DL
BUN SERPL-MCNC: 11 MG/DL (ref 8–22)
CALCIUM SERPL-MCNC: 9.5 MG/DL (ref 8.5–10.5)
CHLORIDE SERPL-SCNC: 105 MMOL/L (ref 96–112)
CO2 SERPL-SCNC: 23 MMOL/L (ref 20–33)
COLOR UR AUTO: YELLOW
CREAT SERPL-MCNC: 0.64 MG/DL (ref 0.5–1.4)
EOSINOPHIL # BLD AUTO: 0.12 K/UL (ref 0–0.51)
EOSINOPHIL NFR BLD: 1.9 % (ref 0–6.9)
ERYTHROCYTE [DISTWIDTH] IN BLOOD BY AUTOMATED COUNT: 42.8 FL (ref 35.9–50)
EST. AVERAGE GLUCOSE BLD GHB EST-MCNC: 105 MG/DL
GLOBULIN SER CALC-MCNC: 3.6 G/DL (ref 1.9–3.5)
GLUCOSE BLD-MCNC: 88 MG/DL (ref 70–100)
GLUCOSE SERPL-MCNC: 91 MG/DL (ref 65–99)
GLUCOSE UR STRIP.AUTO-MCNC: NORMAL MG/DL
HBA1C MFR BLD: 5.3 % (ref 0–5.6)
HCT VFR BLD AUTO: 41.6 % (ref 37–47)
HGB BLD-MCNC: 13.8 G/DL (ref 12–16)
IMM GRANULOCYTES # BLD AUTO: 0.08 K/UL (ref 0–0.11)
IMM GRANULOCYTES NFR BLD AUTO: 1.2 % (ref 0–0.9)
KETONES UR STRIP.AUTO-MCNC: NORMAL MG/DL
LEUKOCYTE ESTERASE UR QL STRIP.AUTO: NORMAL
LYMPHOCYTES # BLD AUTO: 1.59 K/UL (ref 1–4.8)
LYMPHOCYTES NFR BLD: 24.5 % (ref 22–41)
MCH RBC QN AUTO: 28.8 PG (ref 27–33)
MCHC RBC AUTO-ENTMCNC: 33.2 G/DL (ref 33.6–35)
MCV RBC AUTO: 86.7 FL (ref 81.4–97.8)
MONOCYTES # BLD AUTO: 0.31 K/UL (ref 0–0.85)
MONOCYTES NFR BLD AUTO: 4.8 % (ref 0–13.4)
NEUTROPHILS # BLD AUTO: 4.34 K/UL (ref 2–7.15)
NEUTROPHILS NFR BLD: 67 % (ref 44–72)
NITRITE UR QL STRIP.AUTO: POSITIVE
NRBC # BLD AUTO: 0 K/UL
NRBC BLD-RTO: 0 /100 WBC
PH UR STRIP.AUTO: 7 [PH] (ref 5–8)
PLATELET # BLD AUTO: 372 K/UL (ref 164–446)
PMV BLD AUTO: 10.9 FL (ref 9–12.9)
POTASSIUM SERPL-SCNC: 4.1 MMOL/L (ref 3.6–5.5)
PROT SERPL-MCNC: 7.8 G/DL (ref 6–8.2)
PROT UR QL STRIP: NORMAL MG/DL
RBC # BLD AUTO: 4.8 M/UL (ref 4.2–5.4)
RBC UR QL AUTO: NORMAL
SODIUM SERPL-SCNC: 139 MMOL/L (ref 135–145)
SP GR UR STRIP.AUTO: 1.02
UROBILINOGEN UR STRIP-MCNC: NORMAL MG/DL
WBC # BLD AUTO: 6.5 K/UL (ref 4.8–10.8)

## 2021-01-16 PROCEDURE — 82962 GLUCOSE BLOOD TEST: CPT | Performed by: FAMILY MEDICINE

## 2021-01-16 PROCEDURE — 85025 COMPLETE CBC W/AUTO DIFF WBC: CPT

## 2021-01-16 PROCEDURE — 80053 COMPREHEN METABOLIC PANEL: CPT

## 2021-01-16 PROCEDURE — 87086 URINE CULTURE/COLONY COUNT: CPT

## 2021-01-16 PROCEDURE — 99213 OFFICE O/P EST LOW 20 MIN: CPT | Performed by: FAMILY MEDICINE

## 2021-01-16 PROCEDURE — 81002 URINALYSIS NONAUTO W/O SCOPE: CPT | Performed by: FAMILY MEDICINE

## 2021-01-16 PROCEDURE — 83036 HEMOGLOBIN GLYCOSYLATED A1C: CPT

## 2021-01-16 RX ORDER — MECLIZINE HYDROCHLORIDE 25 MG/1
25 TABLET ORAL EVERY 8 HOURS PRN
Qty: 30 TAB | Refills: 0 | Status: SHIPPED | OUTPATIENT
Start: 2021-01-16 | End: 2021-01-23

## 2021-01-16 RX ORDER — ONDANSETRON 4 MG/1
4 TABLET, ORALLY DISINTEGRATING ORAL ONCE
Status: COMPLETED | OUTPATIENT
Start: 2021-01-16 | End: 2021-01-16

## 2021-01-16 RX ADMIN — ONDANSETRON 4 MG: 4 TABLET, ORALLY DISINTEGRATING ORAL at 13:25

## 2021-01-16 ASSESSMENT — FIBROSIS 4 INDEX: FIB4 SCORE: 0.86

## 2021-01-16 NOTE — PROGRESS NOTES
"Chief Complaint   Patient presents with   • Dizziness     x 1 week and nausea         HPI       Patient complains of dizziness for 5 days.   Symptoms are unchanged since onset.   Symptoms are intermittent and describes feeling of dysequilibrium, unsteady on feet.   Worse with bending head to look up, getting up from supine position.    No double vision, hearing loss, numbness, nausea, vomiting, headache, slurred speech or shortness of breath.   Denies depression, anxiety.     Past medical history was unremarkable and not pertinent to current issue      Social History     Tobacco Use   • Smoking status: Never Smoker   • Smokeless tobacco: Never Used   Substance Use Topics   • Alcohol use: No   • Drug use: No            Review of Systems   Constitutional: Negative for fever.   Respiratory: Negative for shortness of breath.    Cardiovascular: Negative for chest pain.   GI - no diarrhea  Neurological: Positive for dizziness. Negative for headaches.   All other systems reviewed and are negative.         Objective:     /76 (BP Location: Left arm, Patient Position: Sitting, BP Cuff Size: Adult)   Pulse 66   Temp 36.3 °C (97.4 °F) (Temporal)   Resp 14   Ht 1.499 m (4' 11\")   Wt 54 kg (119 lb)   SpO2 97%       Physical Exam   Constitutional: She is oriented to person, place, and time. She appears well-developed and well-nourished. No distress.   HENT:   Head: Normocephalic and atraumatic. EOMI.  PERRLA.  No nystagmus  Mouth/Throat: No oropharyngeal exudate.   TMs normal   Eyes: Conjunctivae are normal.   Cardiovascular: Normal rate, regular rhythm and normal heart sounds.    Pulmonary/Chest: Effort normal and breath sounds normal. No respiratory distress. Pt has no wheezes, rales.   Neurological: pt is alert and oriented to person, place, and time. No cranial nerve deficit. Coordination and gait normal.   Skin: Skin is warm. She is not diaphoretic. No erythema.   Psychiatric:  behavior is normal.   Nursing note " and vitals reviewed.              Lab Results   Component Value Date/Time    POCCOLOR Yellow 01/16/2021 01:25 PM    POCAPPEAR Clear 01/16/2021 01:25 PM    POCLEUKEST Neg 01/16/2021 01:25 PM    POCNITRITE Positive 01/16/2021 01:25 PM    POCUROBILIGE Neg 01/16/2021 01:25 PM    POCPROTEIN Neg 01/16/2021 01:25 PM    POCURPH 7.0 01/16/2021 01:25 PM    POCBLOOD Neg 01/16/2021 01:25 PM    POCSPGRV 1.025 01/16/2021 01:25 PM    POCKETONES Neg 01/16/2021 01:25 PM    POCBILIRUBIN Neg 01/16/2021 01:25 PM    POCGLUCUA Neg 01/16/2021 01:25 PM        Assessment/Plan:     1. Benign paroxysmal positional vertigo, unspecified laterality  UA unremarkable.  Glucose - 88  Urine sent for cx    - ondansetron (ZOFRAN ODT) dispertab 4 mg  - meclizine (ANTIVERT) 25 MG Tab; Take 1 Tab by mouth every 8 hours as needed for Nausea/Vomiting for up to 7 days.  Dispense: 30 Tab; Refill: 0  - COMP METABOLIC PANEL  - POCT Glucose      Follow up in one week if no improvement, sooner if symptoms worsen.

## 2021-01-19 LAB
BACTERIA UR CULT: NORMAL
SIGNIFICANT IND 70042: NORMAL
SITE SITE: NORMAL
SOURCE SOURCE: NORMAL

## 2022-08-29 ENCOUNTER — HOSPITAL ENCOUNTER (OUTPATIENT)
Facility: MEDICAL CENTER | Age: 52
End: 2022-08-29
Attending: OBSTETRICS & GYNECOLOGY
Payer: COMMERCIAL

## 2022-08-29 PROCEDURE — 87624 HPV HI-RISK TYP POOLED RSLT: CPT

## 2022-08-29 PROCEDURE — 88175 CYTOPATH C/V AUTO FLUID REDO: CPT

## 2022-09-13 ENCOUNTER — HOSPITAL ENCOUNTER (OUTPATIENT)
Dept: RADIOLOGY | Facility: MEDICAL CENTER | Age: 52
End: 2022-09-13
Attending: OBSTETRICS & GYNECOLOGY
Payer: COMMERCIAL

## 2022-09-13 DIAGNOSIS — Z12.31 VISIT FOR SCREENING MAMMOGRAM: ICD-10-CM

## 2022-09-13 PROCEDURE — 77063 BREAST TOMOSYNTHESIS BI: CPT

## 2023-10-16 ENCOUNTER — PHARMACY VISIT (OUTPATIENT)
Dept: PHARMACY | Facility: MEDICAL CENTER | Age: 53
End: 2023-10-16
Payer: COMMERCIAL

## 2023-10-16 PROCEDURE — RXMED WILLOW AMBULATORY MEDICATION CHARGE: Performed by: INTERNAL MEDICINE

## 2023-10-16 RX ORDER — COVID-19 VACCINE, MRNA 0.04 MG/.418ML
0.3 INJECTION, SUSPENSION INTRAMUSCULAR
Qty: 0.3 ML | Refills: 0 | OUTPATIENT
Start: 2023-10-16

## 2023-10-16 RX ORDER — INFLUENZA A VIRUS A/BRISBANE/02/2018 IVR-190 (H1N1) ANTIGEN (FORMALDEHYDE INACTIVATED), INFLUENZA A VIRUS A/KANSAS/14/2017 X-327 (H3N2) ANTIGEN (FORMALDEHYDE INACTIVATED), INFLUENZA B VIRUS B/PHUKET/3073/2013 ANTIGEN (FORMALDEHYDE INACTIVATED), AND INFLUENZA B VIRUS B/MARYLAND/15/2016 BX-69A ANTIGEN (FORMALDEHYDE INACTIVATED) 15; 15; 15; 15 UG/.5ML; UG/.5ML; UG/.5ML; UG/.5ML
0.5 INJECTION, SUSPENSION INTRAMUSCULAR
Qty: 0.5 ML | Refills: 0 | OUTPATIENT
Start: 2023-10-12

## 2023-10-17 ENCOUNTER — PHARMACY VISIT (OUTPATIENT)
Dept: PHARMACY | Facility: MEDICAL CENTER | Age: 53
End: 2023-10-17
Payer: COMMERCIAL

## 2024-10-20 PROCEDURE — RXMED WILLOW AMBULATORY MEDICATION CHARGE: Performed by: INTERNAL MEDICINE

## 2024-10-21 ENCOUNTER — PHARMACY VISIT (OUTPATIENT)
Dept: PHARMACY | Facility: MEDICAL CENTER | Age: 54
End: 2024-10-21
Payer: COMMERCIAL